# Patient Record
Sex: MALE | Race: WHITE | Employment: OTHER | RURAL
[De-identification: names, ages, dates, MRNs, and addresses within clinical notes are randomized per-mention and may not be internally consistent; named-entity substitution may affect disease eponyms.]

---

## 2017-05-31 ENCOUNTER — DOCUMENTATION ONLY (OUTPATIENT)
Dept: FAMILY MEDICINE CLINIC | Age: 67
End: 2017-05-31

## 2017-05-31 ENCOUNTER — OFFICE VISIT (OUTPATIENT)
Dept: FAMILY MEDICINE CLINIC | Age: 67
End: 2017-05-31

## 2017-05-31 VITALS
BODY MASS INDEX: 26.36 KG/M2 | OXYGEN SATURATION: 96 % | HEIGHT: 69 IN | WEIGHT: 178 LBS | TEMPERATURE: 96.8 F | SYSTOLIC BLOOD PRESSURE: 158 MMHG | RESPIRATION RATE: 18 BRPM | DIASTOLIC BLOOD PRESSURE: 93 MMHG | HEART RATE: 65 BPM

## 2017-05-31 DIAGNOSIS — B18.2 HEP C W/O COMA, CHRONIC (HCC): ICD-10-CM

## 2017-05-31 DIAGNOSIS — G47.00 INSOMNIA, UNSPECIFIED TYPE: ICD-10-CM

## 2017-05-31 DIAGNOSIS — R01.1 MURMUR, HEART: ICD-10-CM

## 2017-05-31 DIAGNOSIS — N40.1 BENIGN NON-NODULAR PROSTATIC HYPERPLASIA WITH LOWER URINARY TRACT SYMPTOMS: ICD-10-CM

## 2017-05-31 DIAGNOSIS — Z78.9 CURRENT NON-SMOKER BUT PAST SMOKING HISTORY UNKNOWN: ICD-10-CM

## 2017-05-31 DIAGNOSIS — Z00.00 EXAMINATION, MEDICAL, GENERAL: Primary | ICD-10-CM

## 2017-05-31 DIAGNOSIS — L57.0 AK (ACTINIC KERATOSIS): ICD-10-CM

## 2017-05-31 DIAGNOSIS — I10 ESSENTIAL HYPERTENSION: ICD-10-CM

## 2017-05-31 DIAGNOSIS — D23.4 BENIGN NEOPLASM OF SKIN OF NECK: ICD-10-CM

## 2017-05-31 PROBLEM — K75.9 HEPATITIS: Status: ACTIVE | Noted: 2017-05-31

## 2017-05-31 RX ORDER — CHLORTHALIDONE 25 MG/1
TABLET ORAL DAILY
COMMUNITY
End: 2017-05-31 | Stop reason: SDUPTHER

## 2017-05-31 RX ORDER — CARVEDILOL 12.5 MG/1
12.5 TABLET ORAL 2 TIMES DAILY WITH MEALS
Qty: 180 TAB | Refills: 1 | Status: SHIPPED | OUTPATIENT
Start: 2017-05-31 | End: 2017-11-27 | Stop reason: SDUPTHER

## 2017-05-31 RX ORDER — ZOLPIDEM TARTRATE 10 MG/1
10 TABLET ORAL
Qty: 20 TAB | Refills: 0 | Status: SHIPPED | OUTPATIENT
Start: 2017-05-31 | End: 2017-08-14 | Stop reason: SDUPTHER

## 2017-05-31 RX ORDER — CARVEDILOL 12.5 MG/1
TABLET ORAL 2 TIMES DAILY WITH MEALS
COMMUNITY
End: 2017-05-31 | Stop reason: SDUPTHER

## 2017-05-31 RX ORDER — ZOLPIDEM TARTRATE 10 MG/1
TABLET ORAL
COMMUNITY
End: 2017-05-31 | Stop reason: SDUPTHER

## 2017-05-31 RX ORDER — AMLODIPINE BESYLATE AND BENAZEPRIL HYDROCHLORIDE 10; 40 MG/1; MG/1
1 CAPSULE ORAL DAILY
Qty: 90 CAP | Refills: 1 | Status: SHIPPED | OUTPATIENT
Start: 2017-05-31 | End: 2017-11-27 | Stop reason: SDUPTHER

## 2017-05-31 RX ORDER — TERAZOSIN 1 MG/1
1 CAPSULE ORAL
Qty: 30 CAP | Refills: 1 | Status: SHIPPED | OUTPATIENT
Start: 2017-05-31 | End: 2017-06-28 | Stop reason: SDUPTHER

## 2017-05-31 RX ORDER — CHLORTHALIDONE 25 MG/1
TABLET ORAL
Qty: 90 TAB | Refills: 1 | Status: SHIPPED | OUTPATIENT
Start: 2017-05-31 | End: 2018-01-03 | Stop reason: SDUPTHER

## 2017-05-31 RX ORDER — AMLODIPINE BESYLATE AND BENAZEPRIL HYDROCHLORIDE 10; 40 MG/1; MG/1
1 CAPSULE ORAL DAILY
COMMUNITY
End: 2017-05-31 | Stop reason: SDUPTHER

## 2017-05-31 NOTE — MR AVS SNAPSHOT
Visit Information Date & Time Provider Department Dept. Phone Encounter #  
 5/31/2017  8:00 AM Kay Schmitz MD [address] [place] [address] [phone] [id number] Follow-up Instructions Return in about 1 month (around 6/30/2017) for follow up and lesion removal. Upcoming Health Maintenance Date Due Hepatitis C Screening 1950 DTaP/Tdap/Td series (1 - Tdap) 12/24/1973 FOBT Q 1 YEAR AGE 50-75 12/24/2002 ZOSTER VACCINE AGE 60> 12/24/2012 INFLUENZA AGE 9 TO ADULT 8/1/2017 Allergies as of 5/31/2017  Review Complete On: 5/31/2017 By: Kay Schmitz MD  
  
 Severity Noted Reaction Type Reactions Pcn [Penicillins]  05/31/2017    Rash Current Immunizations  Never Reviewed No immunizations on file. Not reviewed this visit You Were Diagnosed With   
  
 Codes Comments Examination, medical, general    -  Primary ICD-10-CM: Z00.00 ICD-9-CM: V70.9 Essential hypertension     ICD-10-CM: I10 
ICD-9-CM: 401.9 Hep C w/o coma, chronic (HCC)     ICD-10-CM: B18.2 ICD-9-CM: 070.54 Murmur, heart     ICD-10-CM: R01.1 ICD-9-CM: 785.2 Insomnia, unspecified type     ICD-10-CM: G47.00 ICD-9-CM: 780.52 Benign neoplasm of skin of neck     ICD-10-CM: D23.4 ICD-9-CM: 216.4 Benign non-nodular prostatic hyperplasia with lower urinary tract symptoms     ICD-10-CM: N40.1 ICD-9-CM: 600.91 AK (actinic keratosis)     ICD-10-CM: L57.0 ICD-9-CM: 702.0 Current non-smoker but past smoking history unknown     ICD-10-CM: Z78.9 ICD-9-CM: V49.89 Vitals BP Pulse Temp Resp Height(growth percentile) Weight(growth percentile) (!) 158/93 (BP 1 Location: Right arm, BP Patient Position: Sitting) 65 96.8 °F (36 °C) (Temporal) 18 5' 9\" (1.753 m) 178 lb (80.7 kg) SpO2 BMI Smoking Status 96% 26.29 kg/m2 Former Smoker BMI and BSA Data  Body Mass Index Body Surface Area  
 26.29 kg/m 2 1.98 m 2  
  
 Preferred Pharmacy Pharmacy Name Phone Vishal Champion, [facility] [phone] Your Updated Medication List  
  
   
This list is accurate as of: 5/31/17  9:00 AM.  Always use your most recent med list. amLODIPine-benazepril 10-40 mg per capsule Commonly known as:  Nathaliaa Wills Take 1 Cap by mouth daily. carvedilol 12.5 mg tablet Commonly known as:  Cleda Pamela Take 1 Tab by mouth two (2) times daily (with meals). chlorthalidone 25 mg tablet Commonly known as:  Moses Inches Patients takes 1/2 tab q day  
  
 terazosin 1 mg capsule Commonly known as:  HYTRIN Take 1 Cap by mouth nightly. zolpidem 10 mg tablet Commonly known as:  AMBIEN Take 1 Tab by mouth nightly as needed for Sleep. Max Daily Amount: 10 mg.  
  
  
  
  
Prescriptions Printed Refills  
 zolpidem (AMBIEN) 10 mg tablet 0 Sig: Take 1 Tab by mouth nightly as needed for Sleep. Max Daily Amount: 10 mg.  
 Class: Print Route: Oral  
 terazosin (HYTRIN) 1 mg capsule 1 Sig: Take 1 Cap by mouth nightly. Class: Print Route: Oral  
  
Prescriptions Sent to Pharmacy Refills  
 chlorthalidone (HYGROTEN) 25 mg tablet 1 Sig: Patients takes 1/2 tab q day Class: Normal  
 Pharmacy: [address], [address] Ph #: [phone]  
 carvedilol (COREG) 12.5 mg tablet 1 Sig: Take 1 Tab by mouth two (2) times daily (with meals). Class: Normal  
 Pharmacy: [address], [address] Ph #: [phone] Route: Oral  
 amLODIPine-benazepril (LOTREL) 10-40 mg per capsule 1 Sig: Take 1 Cap by mouth daily. Class: Normal  
 Pharmacy: [address], [address] Ph #: [phone] Route: Oral  
  
We Performed the Following CBC WITH AUTOMATED DIFF [67737 CPT(R)] CHG US ABDOMINAL AORTA REAL TIME SCREEN STUDY AAA [53014 CPT(R)] HCV RNA JESSICA QUALITATIVE [00560 CPT(R)] LIPID PANEL [31566 CPT(R)] METABOLIC PANEL, COMPREHENSIVE [79813 CPT(R)] AL COLLECTION VENOUS BLOOD,VENIPUNCTURE [id number] CPT(R)] AL HANDLG&/OR CONVEY OF SPEC FOR TR OFFICE TO LAB [42951 CPT(R)] PROSTATE SPECIFIC AG (PSA) [id number] CPT(R)] TSH 3RD GENERATION [68816 CPT(R)] URINALYSIS W/ RFLX MICROSCOPIC [11942 CPT(R)] Follow-up Instructions Return in about 1 month (around 6/30/2017) for follow up and lesion removal.  
  
To-Do List   
 06/05/2017 ECHO:  2D ECHO COMPLETE ADULT (TTE) W OR WO CONTR   
  
 06/07/2017 Imaging:  US ABD LTD Patient Instructions Continue current medications Take carvadalol twice daily as directed Add Hytrin one at bedtime Work on diet and exercise Lab work today 
 
ultrasounds Keep planned follow up visit Introducing [facility]! Romie Nolen introduces YourNextLeap patient portal. Now you can access parts of your medical record, email your doctor's office, and request medication refills online. 1. In your internet browser, go to [email or web address] ([email or web address])/The Health [email or web address] 2. Click on the First Time User? Click Here link in the Sign In box. You will see the New Member Sign Up page. 3. Enter your YourNextLeap Access Code exactly as it appears below. You will not need to use this code after youve completed the sign-up process. If you do not sign up before the expiration date, you must request a new code. · YourNextLeap Access Code: [email or web address] Expires: 8/29/2017  8:38 AM 
 
4. Enter the last four digits of your Social Security Number (xxxx) and Date of Birth (mm/dd/yyyy) as indicated and click Submit. You will be taken to the next sign-up page. 5. Create a YourNextLeap ID. This will be your YourNextLeap login ID and cannot be changed, so think of one that is secure and easy to remember. 6. Create a Lytro password. You can change your password at any time. 7. Enter your Password Reset Question and Answer. This can be used at a later time if you forget your password. 8. Enter your e-mail address. You will receive e-mail notification when new information is available in [address] 9. Click Sign Up. You can now view and download portions of your medical record. 10. Click the Download Summary menu link to download a portable copy of your medical information. If you have questions, please visit the Frequently Asked Questions section of the Lytro website. Remember, Lytro is NOT to be used for urgent needs. For medical emergencies, dial 911. Now available from your iPhone and Android! Please provide this summary of care documentation to your next provider. If you have any questions after today's visit, please call [phone].

## 2017-05-31 NOTE — PATIENT INSTRUCTIONS
Continue current medications  Take carvadalol twice daily as directed  Add Hytrin one at bedtime    Work on diet and exercise    Lab work today    ultrasounds    Keep planned follow up visit

## 2017-05-31 NOTE — PROGRESS NOTES
Patient has been scheduled for CPT #95002 US retroperitoneum no auth needed,scheduled for CPT 04333 US ABD ltd no auth needed and patient also scheduled for CPT# 22381 2D Echo complete Auth# [id number] valid 05/31/2017-6/29/2017

## 2017-05-31 NOTE — PROGRESS NOTES
Lola Lacey is a 61 y.o. male who presents to the office today with the following:  Chief Complaint   Patient presents with   [facility].     brought recent records from [employer]       Allergies   Allergen Reactions    Pcn [Penicillins] Rash       Current Outpatient Prescriptions   Medication Sig    chlorthalidone (HYGROTEN) 25 mg tablet Patients takes 1/2 tab q day    carvedilol (COREG) 12.5 mg tablet Take 1 Tab by mouth two (2) times daily (with meals).  amLODIPine-benazepril (LOTREL) 10-40 mg per capsule Take 1 Cap by mouth daily.  zolpidem (AMBIEN) 10 mg tablet Take 1 Tab by mouth nightly as needed for Sleep. Max Daily Amount: 10 mg.  terazosin (HYTRIN) 1 mg capsule Take 1 Cap by mouth nightly. No current facility-administered medications for this visit. Past Medical History:   Diagnosis Date    Hepatitis     type C    Hypertension        No past surgical history on file. History   Smoking Status    Former Smoker    Quit date: 1/1/1986   Smokeless Tobacco    Not on file       Family History   Problem Relation Age of Onset    Hypertension Mother     Cancer Father     Hypertension Father          History of Present Illness:  Patient here for routine annual well visit, ongoing medical follow-up and to establish my practice    Patient with a 10 year history of hypertension. He has been followed by hypertension specialist in [employer]. Last seen 2016. They have tried a variety of different medications and combinations. Currently on carvedilol Lotrel and low-dose chlorthalidone he is compliant with his medication except does forget his a.m. Patient states carvedilol frequently. Patient states they have never really been able to get his blood pressure in good control. Higher doses of Hygroton caused issues with his potassium. Basic metabolic profile normal 3978. EKG normal 2016. He has no cardiac complaints. Remote history of smoking.   Never had abdominal aortic imaging. No history of heart murmur. No history of other cardiac issues angina CAD etc.    Patient has a history of hepatitis C. Diagnosed about 10 years ago. He did undergo liver biopsy in's and some additional testing at that point. He states they did not feel further treatment was indicated then. He is aware that there are new treatments available now and would not mind being rescreened to see if he should consider additional treatment at this point. Blood work from last year reveals a minimal increase in his bilirubin but normal transaminases. Patient does take as needed Ambien. 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 Does not take a regular basis. Patient does have BPH. No personal or family history of prostate cancer. He does get up 2-3 times a night to urinate. He does have some hesitancy during the day. No incontinence. He previously was tried on Rapaflo but had some side effects from this. He has never been tried on Hytrin which may help his BPH as well as hypertension. He does drink a couple beers daily. I did suggest he cut back on that    Patient does have a couple skin lesions to temples and neck he wanted me to look at. They have come up over the last few months. Past medical history is otherwise negative patient had negative colonoscopy    2017. He was told he could go 10 years for his next colonoscopy. No family history of colon cancer    Patient had a tetanus booster within the last 10 years.   He has had the Zostavax      Review of Systems:    Review of systems negative except as noted above      Physical Exam:  Visit Vitals    BP (!) 158/93 (BP 1 Location: Right arm, BP Patient Position: Sitting)    Pulse 65    Temp 96.8 °F (36 °C) (Temporal)    Resp 18    Ht 5' 9\" (1.753 m)    Wt 178 lb (80.7 kg)    SpO2 96%    BMI 26.29 kg/m2     Vitals:    05/31/17 0800   BP: (!) 158/93   BP 1 Location: Right arm   BP Patient Position: Sitting   Pulse: 65   Resp: 18   Temp: 96.8 °F (36 °C)   TempSrc: Temporal   SpO2: 96%   Weight: 178 lb (80.7 kg)   Height: 5' 9\" (1.753 m)     Patient no acute distress vital signs repeated and as above  Head is normocephalic. Patient did have some actinic scaly patches both temples  Affect was normal  External ears normal.  Ear canals normal TMs were clear. Hearing was normal  Eyes PERRLA. EOMs full. Sclera clear. He does see his eye doctor regularly  Nose within normal limits. Nares  normal.  No significant congestion  OP mucosa normal, no obvious lesions. Pharynx normal.  No erythema or exudate. Structures midline. Juancralos Buff dental repair. He follows with his dentist regularly  Neck no nodes no masses no bruits  Right side of his neck patient had a 2 mm raised red pearly lesion consistent with early basal cell  Chest was clear no wheezes rhonchi or rales. Good air exchange  Cor regular rate and rhythm no P5-V2 1/6 systolic murmur  Abdomen no HSM no masses soft and was nontender  External rectal area was normal.  Digital exam revealed no masses. Prostate symmetrically enlarged. Soft and rubbery texture. No nodules  Extremities no edema. Nontender. Good range of motion  Gait and gross neurologic exam were normal    Assessment/Plan:  1. Examination, medical, general      2. Essential hypertension  Not optimally controlled on his current regiment. He can continue his current medications. Encouraged him to take his carvedilol twice daily as directed. Adding in low-dose Hytrin both for BPH and blood pressure control. I will see him back in 1 month to review. In the meantime I am also getting a screening abdominal aortic ultrasound. - chlorthalidone (HYGROTEN) 25 mg tablet; Patients takes 1/2 tab q day  Dispense: 90 Tab; Refill: 1  - carvedilol (COREG) 12.5 mg tablet; Take 1 Tab by mouth two (2) times daily (with meals). Dispense: 180 Tab;  Refill: 1  - amLODIPine-benazepril (LOTREL) 10-40 mg per capsule; Take 1 Cap by mouth daily. Dispense: 90 Cap; Refill: 1  - terazosin (HYTRIN) 1 mg capsule; Take 1 Cap by mouth nightly. Dispense: 30 Cap; Refill: 1  - CBC WITH AUTOMATED DIFF  - METABOLIC PANEL, COMPREHENSIVE  - URINALYSIS W/ RFLX MICROSCOPIC  - LIPID PANEL  - TSH 3RD GENERATION  - 2D ECHO COMPLETE ADULT (TTE) W OR WO CONTR; Future  - CHG US ABDOMINAL AORTA REAL TIME SCREEN STUDY AAA    3. Hep C w/o coma, chronic (HCC)    - HCV RNA JESSICA QUALITATIVE  - US ABD LTD; Future  - CT HANDLG&/OR CONVEY OF SPEC FOR TR OFFICE TO LAB  - CT COLLECTION VENOUS BLOOD,VENIPUNCTURE    4. Murmur, heart    - 2D ECHO COMPLETE ADULT (TTE) W OR WO CONTR; Future    5. Insomnia, unspecified type    - zolpidem (AMBIEN) 10 mg tablet; Take 1 Tab by mouth nightly as needed for Sleep. Max Daily Amount: 10 mg. Dispense: 20 Tab; Refill: 0    6. Benign neoplasm of skin of neck  Scheduling patient back for biopsy of the lesion of the neck and freezing of the actinic keratoses    7. Benign non-nodular prostatic hyperplasia with lower urinary tract symptoms    - PROSTATE SPECIFIC AG    8. AK (actinic keratosis)  Bringing patient back for cryotherapy    9. Current non-smoker but past smoking history unknown    - CHG US ABDOMINAL AORTA REAL TIME SCREEN STUDY AAA    Patient Instructions   Continue current medications  Take carvadalol twice daily as directed  Add Hytrin one at bedtime    Work on diet and exercise    Lab work today    ultrasounds    Keep planned follow up visit           Continue current therapy plan except for indicated above. Verbal and written instructions (see AVS) provided.  Patient expresses understanding of diagnosis and treatment plan. Follow-up Disposition:  Return in about 1 month (around 6/30/2017) for follow up and lesion removal.        Laverne Ortega.  Akila Moody MD

## 2017-06-02 LAB
ALBUMIN SERPL-MCNC: 4.8 G/DL (ref 3.6–4.8)
ALBUMIN/GLOB SERPL: 1.7 {RATIO} (ref 1.2–2.2)
ALP SERPL-CCNC: 54 IU/L (ref 39–117)
ALT SERPL-CCNC: 40 IU/L (ref 0–44)
APPEARANCE UR: CLEAR
AST SERPL-CCNC: 37 IU/L (ref 0–40)
BASOPHILS # BLD AUTO: 0 X10E3/UL (ref 0–0.2)
BASOPHILS NFR BLD AUTO: 1 %
BILIRUB SERPL-MCNC: 1.1 MG/DL (ref 0–1.2)
BILIRUB UR QL STRIP: NEGATIVE
BUN SERPL-MCNC: 14 MG/DL (ref 8–27)
BUN/CREAT SERPL: 15 (ref 10–24)
CALCIUM SERPL-MCNC: 10.3 MG/DL (ref 8.6–10.2)
CHLORIDE SERPL-SCNC: 98 MMOL/L (ref 96–106)
CHOLEST SERPL-MCNC: 159 MG/DL (ref 100–199)
CO2 SERPL-SCNC: 27 MMOL/L (ref 18–29)
COLOR UR: YELLOW
CREAT SERPL-MCNC: 0.94 MG/DL (ref 0.76–1.27)
EOSINOPHIL # BLD AUTO: 0.1 X10E3/UL (ref 0–0.4)
EOSINOPHIL NFR BLD AUTO: 3 %
ERYTHROCYTE [DISTWIDTH] IN BLOOD BY AUTOMATED COUNT: 13.7 % (ref 12.3–15.4)
GLOBULIN SER CALC-MCNC: 2.8 G/DL (ref 1.5–4.5)
GLUCOSE SERPL-MCNC: 106 MG/DL (ref 65–99)
GLUCOSE UR QL: NEGATIVE
HCT VFR BLD AUTO: 47.9 % (ref 37.5–51)
HCV RNA SERPL QL NAA+PROBE: POSITIVE
HDLC SERPL-MCNC: 56 MG/DL
HGB BLD-MCNC: 16.1 G/DL (ref 12.6–17.7)
HGB UR QL STRIP: NEGATIVE
IMM GRANULOCYTES # BLD: 0 X10E3/UL (ref 0–0.1)
IMM GRANULOCYTES NFR BLD: 0 %
INTERPRETATION, 910389: NORMAL
KETONES UR QL STRIP: NEGATIVE
LDLC SERPL CALC-MCNC: 84 MG/DL (ref 0–99)
LEUKOCYTE ESTERASE UR QL STRIP: NEGATIVE
LYMPHOCYTES # BLD AUTO: 1.5 X10E3/UL (ref 0.7–3.1)
LYMPHOCYTES NFR BLD AUTO: 36 %
MCH RBC QN AUTO: 29.7 PG (ref 26.6–33)
MCHC RBC AUTO-ENTMCNC: 33.6 G/DL (ref 31.5–35.7)
MCV RBC AUTO: 88 FL (ref 79–97)
MICRO URNS: NORMAL
MONOCYTES # BLD AUTO: 0.5 X10E3/UL (ref 0.1–0.9)
MONOCYTES NFR BLD AUTO: 13 %
NEUTROPHILS # BLD AUTO: 1.9 X10E3/UL (ref 1.4–7)
NEUTROPHILS NFR BLD AUTO: 47 %
NITRITE UR QL STRIP: NEGATIVE
PH UR STRIP: 6 [PH] (ref 5–7.5)
PLATELET # BLD AUTO: 187 X10E3/UL (ref 150–379)
POTASSIUM SERPL-SCNC: 4.5 MMOL/L (ref 3.5–5.2)
PROT SERPL-MCNC: 7.6 G/DL (ref 6–8.5)
PROT UR QL STRIP: NEGATIVE
PSA SERPL-MCNC: 2.4 NG/ML (ref 0–4)
RBC # BLD AUTO: 5.43 X10E6/UL (ref 4.14–5.8)
SODIUM SERPL-SCNC: 140 MMOL/L (ref 134–144)
SP GR UR: 1.02 (ref 1–1.03)
TRIGL SERPL-MCNC: 94 MG/DL (ref 0–149)
TSH SERPL DL<=0.005 MIU/L-ACNC: 1.37 UIU/ML (ref 0.45–4.5)
UROBILINOGEN UR STRIP-MCNC: 1 MG/DL (ref 0.2–1)
VLDLC SERPL CALC-MCNC: 19 MG/DL (ref 5–40)
WBC # BLD AUTO: 4 X10E3/UL (ref 3.4–10.8)

## 2017-06-02 NOTE — PROGRESS NOTES
Labs reviewed and discussed with patient   CBC normal  Chemistry panel including LFTs normal  PSA normal  Hepatitis C RNA was positive indicating ongoing chronic hepatitis C infection. I did suggest follow-up with hepatologist to discuss new treatment options as he has not been evaluated by them in close to 10 years  Patient is aware but wants to wait until he gets Medicare insurance later on this year  He will let me know when he is ready to schedule the appointment    Lipid panel was excellent at 159 with an LDL of 84. He does not have CAD. Previous cardiologist did not recommend statins.   Given his chronic hepatitis C and excellent lipid profile, I am recommending continuing with his diet and exercise program  Patient has follow-up later this summer

## 2017-06-08 PROBLEM — K80.20 ASYMPTOMATIC GALLSTONES: Status: ACTIVE | Noted: 2017-06-08

## 2017-06-08 PROBLEM — Q61.02 MULTIPLE RENAL CYSTS: Status: ACTIVE | Noted: 2017-06-08

## 2017-06-28 ENCOUNTER — DOCUMENTATION ONLY (OUTPATIENT)
Dept: FAMILY MEDICINE CLINIC | Age: 67
End: 2017-06-28

## 2017-06-28 ENCOUNTER — OFFICE VISIT (OUTPATIENT)
Dept: FAMILY MEDICINE CLINIC | Age: 67
End: 2017-06-28

## 2017-06-28 VITALS
DIASTOLIC BLOOD PRESSURE: 81 MMHG | HEART RATE: 60 BPM | SYSTOLIC BLOOD PRESSURE: 133 MMHG | RESPIRATION RATE: 16 BRPM | OXYGEN SATURATION: 98 % | BODY MASS INDEX: 26.43 KG/M2 | WEIGHT: 179 LBS | TEMPERATURE: 97.1 F

## 2017-06-28 DIAGNOSIS — L57.0 AK (ACTINIC KERATOSIS): ICD-10-CM

## 2017-06-28 DIAGNOSIS — N28.1 RENAL CYST: ICD-10-CM

## 2017-06-28 DIAGNOSIS — K80.20 CALCULUS OF GALLBLADDER WITHOUT CHOLECYSTITIS WITHOUT OBSTRUCTION: ICD-10-CM

## 2017-06-28 DIAGNOSIS — B18.2 HEP C W/O COMA, CHRONIC (HCC): ICD-10-CM

## 2017-06-28 DIAGNOSIS — K21.9 GASTROESOPHAGEAL REFLUX DISEASE WITHOUT ESOPHAGITIS: ICD-10-CM

## 2017-06-28 DIAGNOSIS — I51.7: ICD-10-CM

## 2017-06-28 DIAGNOSIS — N40.1 BENIGN NON-NODULAR PROSTATIC HYPERPLASIA WITH LOWER URINARY TRACT SYMPTOMS: ICD-10-CM

## 2017-06-28 DIAGNOSIS — I10 ESSENTIAL HYPERTENSION: Primary | ICD-10-CM

## 2017-06-28 DIAGNOSIS — D23.4 BENIGN NEOPLASM OF SKIN OF NECK: ICD-10-CM

## 2017-06-28 RX ORDER — ASPIRIN 81 MG/1
81 TABLET ORAL DAILY
COMMUNITY
Start: 2017-06-28

## 2017-06-28 RX ORDER — OMEPRAZOLE 10 MG/1
10 CAPSULE, DELAYED RELEASE ORAL DAILY
COMMUNITY
End: 2019-02-22 | Stop reason: SDUPTHER

## 2017-06-28 RX ORDER — TERAZOSIN 1 MG/1
1 CAPSULE ORAL
Qty: 90 CAP | Refills: 1 | Status: SHIPPED | OUTPATIENT
Start: 2017-06-28 | End: 2017-08-14 | Stop reason: DRUGHIGH

## 2017-06-28 NOTE — PROGRESS NOTES
Above discussed with patient. At the moment he wishes to hold on scheduling cardiology or Holter monitor until he gets Shady Sauer.

## 2017-06-28 NOTE — PROGRESS NOTES
Spoke with Dr Niki Li and they do not participate with the patients insurance we do here but it is not accepted thru the [facility] where Dr Colleen Kingsley bills thru so we will need to set him up with [facility] and it will need to be an appointment with the cardiologist first and then placement of holter

## 2017-06-28 NOTE — PROGRESS NOTES
Chief Complaint   Patient presents with    Follow-up     lesion (neck/face) removal     Visit Vitals    /81 (BP 1 Location: Right arm, BP Patient Position: Sitting)    Pulse 60    Temp 97.1 °F (36.2 °C) (Oral)    Resp 16    Wt 179 lb (81.2 kg)    SpO2 98%    BMI 26.43 kg/m2     Jordan Donaldson LPN

## 2017-06-28 NOTE — PATIENT INSTRUCTIONS
Continue current medications  Start baby aspirin daily    Work on diet and exercise    Holter moniter    Keep planned follow up visit     Keep a arrea clean and dry   sutuer removal 1 week

## 2017-06-28 NOTE — MR AVS SNAPSHOT
Visit Information Date & Time Provider Department Dept. Phone Encounter #  
 6/28/2017  8:00 AM Pau Clayton MD [address] [place] [address] Avenue [phone] [id number] Follow-up Instructions Return in about 1 week (around 7/5/2017) for suture removal. Upcoming Health Maintenance Date Due Pneumococcal 19-64 Medium Risk (1 of 1 - PPSV23) 12/24/1971 INFLUENZA AGE 9 TO ADULT 8/1/2017 DTaP/Tdap/Td series (2 - Td) 5/31/2027 COLONOSCOPY 5/31/2027 Allergies as of 6/28/2017  Review Complete On: 6/28/2017 By: Pau Clayton MD  
  
 Severity Noted Reaction Type Reactions Pcn [Penicillins]  05/31/2017    Rash Current Immunizations  Never Reviewed No immunizations on file. Not reviewed this visit You Were Diagnosed With   
  
 Codes Comments Essential hypertension    -  Primary ICD-10-CM: I10 
ICD-9-CM: 401.9 Hep C w/o coma, chronic (HCC)     ICD-10-CM: B18.2 ICD-9-CM: 070.54 Benign neoplasm of skin of neck     ICD-10-CM: D23.4 ICD-9-CM: 216.4 AK (actinic keratosis)     ICD-10-CM: L57.0 ICD-9-CM: 702.0 Calculus of gallbladder without cholecystitis without obstruction     ICD-10-CM: K80.20 ICD-9-CM: 574.20 Renal cyst     ICD-10-CM: N28.1 ICD-9-CM: 753.10 Benign non-nodular prostatic hyperplasia with lower urinary tract symptoms     ICD-10-CM: N40.1 ICD-9-CM: 600.91   
 TAMERA (left atrial hypertrophy)     ICD-10-CM: I51.7 ICD-9-CM: 429.3 Gastroesophageal reflux disease without esophagitis     ICD-10-CM: K21.9 ICD-9-CM: 530.81 Vitals BP Pulse Temp Resp Weight(growth percentile) SpO2  
 133/81 (BP 1 Location: Right arm, BP Patient Position: Sitting) 60 97.1 °F (36.2 °C) (Oral) 16 179 lb (81.2 kg) 98% BMI Smoking Status 26.43 kg/m2 Former Smoker BMI and BSA Data Body Mass Index Body Surface Area  
 26.43 kg/m 2 1.99 m 2 [phone] Your Updated Medication List  
  
   
This list is accurate as of: 6/28/17  8:46 AM.  Always use your most recent med list. amLODIPine-benazepril 10-40 mg per capsule Commonly known as:  Lilyan Gather Take 1 Cap by mouth daily. aspirin delayed-release 81 mg tablet Take 1 Tab by mouth daily. carvedilol 12.5 mg tablet Commonly known as:  Esaw Poppy Take 1 Tab by mouth two (2) times daily (with meals). chlorthalidone 25 mg tablet Commonly known as:  Elizabeth Reus Patients takes 1/2 tab q day PriLOSEC 10 mg capsule Generic drug:  omeprazole Take 10 mg by mouth daily. terazosin 1 mg capsule Commonly known as:  HYTRIN Take 1 Cap by mouth nightly. zolpidem 10 mg tablet Commonly known as:  AMBIEN Take 1 Tab by mouth nightly as needed for Sleep. Max Daily Amount: 10 mg.  
  
  
  
  
Prescriptions Sent to Pharmacy Refills  
 terazosin (HYTRIN) 1 mg capsule 1 Sig: Take 1 Cap by mouth nightly. Class: Normal  
 Pharmacy: [address], [address] [place] [address] #: [phone] Route: Oral  
  
We Performed the Following DESTRUC BENIGN LESION, UP TO 14 LESIONS [68688 CPT(R)] Follow-up Instructions Return in about 1 week (around 7/5/2017) for suture removal.  
  
  
Patient Instructions Continue current medications Start baby aspirin daily Work on diet and exercise Holter moniter Keep planned follow up visit Keep a arrea clean and dry  
sutuer removal 1 week Introducing [facility]! Aranza Wilson introduces WeShow patient portal. Now you can access parts of your medical record, email your doctor's office, and request medication refills online. 1. In your internet browser, go to [email or web address] 2. Click on the First Time User? Click Here link in the Sign In box.  You will see the New Member Sign Up page. 3. Enter your Resale Therapy Access Code exactly as it appears below. You will not need to use this code after youve completed the sign-up process. If you do not sign up before the expiration date, you must request a new code. · Resale Therapy Access Code: [email or web address] Expires: 8/29/2017  8:38 AM 
 
4. Enter the last four digits of your Social Security Number (xxxx) and Date of Birth (mm/dd/yyyy) as indicated and click Submit. You will be taken to the next sign-up page. 5. Create a Resale Therapy ID. This will be your Resale Therapy login ID and cannot be changed, so think of one that is secure and easy to remember. 6. Create a Resale Therapy password. You can change your password at any time. 7. Enter your Password Reset Question and Answer. This can be used at a later time if you forget your password. 8. Enter your e-mail address. You will receive e-mail notification when new information is available in [address] 9. Click Sign Up. You can now view and download portions of your medical record. 10. Click the Download Summary menu link to download a portable copy of your medical information. If you have questions, please visit the Frequently Asked Questions section of the Resale Therapy website. Remember, Resale Therapy is NOT to be used for urgent needs. For medical emergencies, dial 911. Now available from your iPhone and Android! Please provide this summary of care documentation to your next provider. Your primary care clinician is listed as Jillian Nicolas. If you have any questions after today's visit, please call [phone].

## 2017-06-28 NOTE — PROGRESS NOTES
Lola Lacey is a 61 y.o. male who presents to the office today with the following:  Chief Complaint   Patient presents with    Follow-up     lesion (neck/face) removal       Allergies   Allergen Reactions    Pcn [Penicillins] Rash       Current Outpatient Prescriptions   Medication Sig    omeprazole (PRILOSEC) 10 mg capsule Take 10 mg by mouth daily.  aspirin delayed-release 81 mg tablet Take 1 Tab by mouth daily.  terazosin (HYTRIN) 1 mg capsule Take 1 Cap by mouth nightly.  chlorthalidone (HYGROTEN) 25 mg tablet Patients takes 1/2 tab q day    carvedilol (COREG) 12.5 mg tablet Take 1 Tab by mouth two (2) times daily (with meals).  amLODIPine-benazepril (LOTREL) 10-40 mg per capsule Take 1 Cap by mouth daily.  zolpidem (AMBIEN) 10 mg tablet Take 1 Tab by mouth nightly as needed for Sleep. Max Daily Amount: 10 mg. No current facility-administered medications for this visit. Past Medical History:   Diagnosis Date    Asymptomatic gallstones 6/8/2017    Hepatitis     type C    Hypertension     Multiple renal cysts 6/8/2017    Multiple right renal cysts Largest 8.3 cm on ultrasound June 2017       History reviewed. No pertinent surgical history. History   Smoking Status    Former Smoker    Quit date: 1/1/1986   Smokeless Tobacco    Never Used       Family History   Problem Relation Age of Onset    Hypertension Mother     Cancer Father     Hypertension Father          History of Present Illness:  Patient here for lesion removal and medical follow-up    Patient has a history of sun exposure. He has a few scaly patches on his forehead bilaterally consistent with actinic keratoses. These were frozen with liquid nitrogen today. He has a raised lesion right side of his neck that has been there for  a while.   It looks like it could have been a small inflamed cyst but it did have a bit of pearly quality to it and I  brought him back for biopsy for definitive diagnosis      Patient with a 10 year history of hypertension. He has been followed by hypertension specialist in [place]. Last seen 2016. They have tried a variety of different medications and combinations. Continues with carvedilol Lotrel and low-dose chlorthalidone he is compliant with his medication. Previously forgetting his a.m. dose of carvedilol. He states he is more compliant since the last visit. We did add in low-dose Hytrin at the last visit. He is tolerated this well and blood pressures in good control today. Base metabolic profile was normal.  EKG 2016 was normal.  Ultrasound abdominal aorta 6/17- for aneurysm. He has no cardiac complaints. No history of cardiac disease angina etc. he is willing to start baby aspirin daily    I did note a heart murmur at the last visit. We did an echocardiogram.  This showed minimal mitral regurg. Also showed some impaired diastolic relaxation. Cardiology did note severe left atrial enlargement. I did discuss the case with cardiology who read the echo. They felt this might of been over read based on technique but did suggest getting a Holter monitor to rule out asymptomatic paroxysmal atrial fibrillation given the enlarged atrium. Patient has no complaints of palpitations. We tried to set up the Holter monitor. The local cardiology office who on the phone had agreed to do the Holter monitor without an official office visit does not take his insurance. The options and will be to send him to a different cardiologist for evaluation and Holter monitor. At the moment patient does not want to do that because of poor insurance coverage. He is aware of the potential risks. He is going to take his baby aspirin. He will consider getting the study when he gets his Medicare insurance    Patient has a history of hepatitis C. Diagnosed about 10 years ago. He did undergo liver biopsy in's and some additional testing at that point.   He states they did not feel further treatment was indicated then. He is aware that there are new treatments available now and would not mind being rescreened to see if he should consider additional treatment at this point. Blood work from June 2017 confirms ongoing hepatitis C. LFTs are normal.  I have recommended referral back to hepatology for consideration of treatment options. Patient is aware but he wishes to wait until he gets Shady Sauer. Ultrasound right upper quadrant revealed normal liver    Abdominal ultrasound also revealed gallstones. Gallbladder was not inflamed. He has no symptoms referable to his gallbladder. We will follow. He will notify me if he develops any symptoms    Patient was found to have renal cysts on the right. Largest 8.3 cm. Nonobstructing. We will follow in the future. Patient does have a history of GERD. He has had peptic ulcer disease in the past.  He has had an EGD in the past.  He continues on low-dose omeprazole. He states he gets symptoms if he tries to stop this medication. He has tried Zantac in the past but that does not control his symptoms. We did discuss risks of long-term PPI use. He wishes to continue his current regimen    Patient does take as needed Ambien. 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 Does not take a regular basis. Patient does have BPH. No personal or family history of prostate cancer. He was up 2-3 times a night to urinate. He does have some hesitancy during the day. No incontinence. He previously was tried on Rapaflo but had some side effects from this. Prostate exam 6/17 revealed an enlarged prostate without nodules. PSA was normal.  We did start patient on Hytrin both for blood pressure control and BPH. He states that is worked well. His urinary symptoms have improved       Past medical history is otherwise negative patient had negative colonoscopy    2017.   He was told he could go 10 years for his next colonoscopy. No family history of colon cancer    Patient had a tetanus booster within the last 10 years. He has had the Zostavax      Review of Systems:    Review of systems negative except as noted above      Physical Exam:  Visit Vitals    /81 (BP 1 Location: Right arm, BP Patient Position: Sitting)    Pulse 60    Temp 97.1 °F (36.2 °C) (Oral)    Resp 16    Wt 179 lb (81.2 kg)    SpO2 98%    BMI 26.43 kg/m2     Vitals:    06/28/17 0802   BP: 133/81   BP 1 Location: Right arm   BP Patient Position: Sitting   Pulse: 60   Resp: 16   Temp: 97.1 °F (36.2 °C)   TempSrc: Oral   SpO2: 98%   Weight: 179 lb (81.2 kg)     Patient no acute distress vital signs repeated and as above. Improved from previous  Head is normocephalic. Patient did have some actinic scaly patches both temples. These were frozen with liquid nitrogen  Affect was normal  External ears normal.  Eyes PERRLA. EOMs full. Sclera clear. He does see his eye doctor regularly  Neck on the right side did have a 4 mm raised slightly pearly papular lesion  Neck no nodes no masses no bruits  Chest was clear no wheezes rhonchi or rales. Good air exchange  Cor regular rate and rhythm no S3-S4 no murmur appreciated today  Abdomen no HSM no masses soft and was nontender  Extremities no edema. Nontender. Good range of motion  Gait and gross neurologic exam were normal      Office procedure  Risks and benefits of lesion excision discussed with patient and consent was signed  Area was cleaned with Betadine and sterilely draped  Anesthetized with 2% Xylocaine with epi  Lesion was excised using 4 mm punch biopsy  Closed with 4-0 Ethilon ×1 with good approximation of the wound edges  Sterile dressing was applied  Wound care was discussed with patient  Patient had no complications of the procedure and complete did not complain of any discomfort    1. Essential hypertension  Improved on current regimen.   Will continue current medications  - terazosin (HYTRIN) 1 mg capsule; Take 1 Cap by mouth nightly. Dispense: 90 Cap; Refill: 1    2. Hep C w/o coma, chronic ([address]  Recommending follow-up with hepatology. Patient wishes to postpone this until he gets Medicare insurance    3. Benign neoplasm of skin of neck  Lesion removal as noted above. Patient will be back in 1 week for suture removal and  - EXC SKIN BENIG <5MM TRUNK,ARM,LEG    4. AK (actinic keratosis)  Frozen today with liquid nitrogen  - DESTRUC BENIGN LESION, UP TO 14 LESIONS    5. Calculus of gallbladder without cholecystitis without obstruction  Asymptomatic we will follow    6. Renal cyst  We will follow in the future    7. Benign non-nodular prostatic hyperplasia with lower urinary tract symptoms  Symptoms improved on Hytrin    8. TAMERA (left atrial hypertrophy)  Holter monitor recommended. Patient placing this on hold because of insurance reasons    9. Gastroesophageal reflux disease without esophagitis  Asymptomatic on current regimen      Patient Instructions   Continue current medications  Start baby aspirin daily    Work on diet and exercise    Holter moniter    Keep planned follow up visit     Keep a arrea clean and dry   sutuer removal 1 week          Continue current therapy plan except for indicated above. Verbal and written instructions (see AVS) provided.  Patient expresses understanding of diagnosis and treatment plan. Follow-up Disposition:  Return in about 1 week (around 7/5/2017) for suture removal.        Kate Blandon.  Ayala Carney MD

## 2017-07-05 ENCOUNTER — OFFICE VISIT (OUTPATIENT)
Dept: FAMILY MEDICINE CLINIC | Age: 67
End: 2017-07-05

## 2017-07-05 VITALS
WEIGHT: 181.2 LBS | DIASTOLIC BLOOD PRESSURE: 80 MMHG | BODY MASS INDEX: 26.84 KG/M2 | RESPIRATION RATE: 16 BRPM | TEMPERATURE: 97.3 F | HEART RATE: 61 BPM | OXYGEN SATURATION: 96 % | SYSTOLIC BLOOD PRESSURE: 135 MMHG | HEIGHT: 69 IN

## 2017-07-05 DIAGNOSIS — I10 ESSENTIAL HYPERTENSION: ICD-10-CM

## 2017-07-05 DIAGNOSIS — Z48.02 VISIT FOR SUTURE REMOVAL: Primary | ICD-10-CM

## 2017-07-05 DIAGNOSIS — C44.91 BASAL CELL CARCINOMA: ICD-10-CM

## 2017-07-05 NOTE — MR AVS SNAPSHOT
Visit Information Date & Time Provider Department Dept. Phone Encounter #  
 7/5/2017  8:20 AM Kvng Allen MD [facility] [phone] [id number] Upcoming Health Maintenance Date Due Pneumococcal 19-64 Medium Risk (1 of 1 - PPSV23) 12/24/1969 INFLUENZA AGE 9 TO ADULT 8/1/2017 DTaP/Tdap/Td series (2 - Td) 5/31/2027 COLONOSCOPY 5/31/2027 Allergies as of 7/5/2017  Review Complete On: 7/5/2017 By: Kvng Allen MD  
  
 Severity Noted Reaction Type Reactions Pcn [Penicillins]  05/31/2017    Rash Current Immunizations  Never Reviewed No immunizations on file. Not reviewed this visit Vitals BP Pulse Temp Resp Height(growth percentile) Weight(growth percentile) 153/84 (BP 1 Location: Left arm, BP Patient Position: Sitting) 61 97.3 °F (36.3 °C) (Oral) 16 5' 9\" (1.753 m) 181 lb 3.2 oz (82.2 kg) SpO2 BMI Smoking Status 96% 26.76 kg/m2 Former Smoker BMI and BSA Data Body Mass Index Body Surface Area  
 26.76 kg/m 2 2 m 2 Preferred Pharmacy Pharmacy Name [address] [facility] [phone] Your Updated Medication List  
  
   
This list is accurate as of: 7/5/17  9:07 AM.  Always use your most recent med list. amLODIPine-benazepril 10-40 mg per capsule Commonly known as:  Elester Rajas Take 1 Cap by mouth daily. aspirin delayed-release 81 mg tablet Take 1 Tab by mouth daily. carvedilol 12.5 mg tablet Commonly known as:  Alvin Lacrosse Take 1 Tab by mouth two (2) times daily (with meals). chlorthalidone 25 mg tablet Commonly known as:  Ulyess Cirri Patients takes 1/2 tab q day PriLOSEC 10 mg capsule Generic drug:  omeprazole Take 10 mg by mouth daily. terazosin 1 mg capsule Commonly known as:  HYTRIN Take 1 Cap by mouth nightly. zolpidem 10 mg tablet Commonly known as:  AMBIEN Take 1 Tab by mouth nightly as needed for Sleep. Max Daily Amount: 10 mg. Introducing [facility]! [facility] introduces [employer] patient portal. Now you can access parts of your medical record, email your doctor's office, and request medication refills online. 1. In your internet browser, go to [email or web address] 2. Click on the First Time User? Click Here link in the Sign In box. You will see the New Member Sign Up page. 3. Enter your [employer] Access Code exactly as it appears below. You will not need to use this code after youve completed the sign-up process. If you do not sign up before the expiration date, you must request a new code. · [employer] Access Code: [email or web address] Expires: 8/29/2017  8:38 AM 
 
4. Enter the last four digits of your Social Security Number (xxxx) and Date of Birth (mm/dd/yyyy) as indicated and click Submit. You will be taken to the next sign-up page. 5. Create a [employer] ID. This will be your [employer] login ID and cannot be changed, so think of one that is secure and easy to remember. 6. Create a [employer] password. You can change your password at any time. 7. Enter your Password Reset Question and Answer. This can be used at a later time if you forget your password. 8. Enter your e-mail address. You will receive e-mail notification when new information is available in [address] 9. Click Sign Up. You can now view and download portions of your medical record. 10. Click the Download Summary menu link to download a portable copy of your medical information. If you have questions, please visit the Frequently Asked Questions section of the [employer] website. Remember, [employer] is NOT to be used for urgent needs. For medical emergencies, dial 911. Now available from your iPhone and Android! Please provide this summary of care documentation to your next provider. Your primary care clinician is listed as Yanni Potter. If you have any questions after today's visit, please call [phone].

## 2017-07-05 NOTE — PROGRESS NOTES
Helder Marques is a 61 y.o. male who presents to the office today with the following:  Chief Complaint   Patient presents with    Suture Removal     neck       Allergies   Allergen Reactions    Pcn [Penicillins] Rash       Current Outpatient Prescriptions   Medication Sig    omeprazole (PRILOSEC) 10 mg capsule Take 10 mg by mouth daily.  aspirin delayed-release 81 mg tablet Take 1 Tab by mouth daily.  terazosin (HYTRIN) 1 mg capsule Take 1 Cap by mouth nightly.  chlorthalidone (HYGROTEN) 25 mg tablet Patients takes 1/2 tab q day    carvedilol (COREG) 12.5 mg tablet Take 1 Tab by mouth two (2) times daily (with meals).  amLODIPine-benazepril (LOTREL) 10-40 mg per capsule Take 1 Cap by mouth daily.  zolpidem (AMBIEN) 10 mg tablet Take 1 Tab by mouth nightly as needed for Sleep. Max Daily Amount: 10 mg. No current facility-administered medications for this visit. Past Medical History:   Diagnosis Date    Asymptomatic gallstones 6/8/2017    Hepatitis     type C    Hypertension     Multiple renal cysts 6/8/2017    Multiple right renal cysts Largest 8.3 cm on ultrasound June 2017       No past surgical history on file. History   Smoking Status    Former Smoker    Quit date: 1/1/1986   Smokeless Tobacco    Never Used       Family History   Problem Relation Age of Onset    Hypertension Mother     Cancer Father     Hypertension Father          History of Present Illness:  Patient here for suture removal    I did remove a lesion from the right side of his neck last week. biopsy did come back showing basal cell carcinoma. Otologist felt it was completely excised. I felt that the lesion did extend close to the biopsy. He tolerated the procedure well. The stitch came out on its own. It is healing nicely. Patient does have some other skin lesions. Some look quite benign. There is one lesion right mid back which is possibly a basal cell.   He also has a scaly lesion on his right hand which he has been told in the past was a skin cancer but also then told it was psoriasis. It tends to come and go. In light of all the above I did suggest patient be seen by dermatology for full skin exam.  He is aware but because of insurance reason he is hesitant at this point. His blood pressure remains in good control on his current regiment with the addition of Hytrin      Review of Systems:      Review of systems negative except as noted above    Physical Exam:  Visit Vitals    /84 (BP 1 Location: Left arm, BP Patient Position: Sitting)    Pulse 61    Temp 97.3 °F (36.3 °C) (Oral)    Resp 16    Ht 5' 9\" (1.753 m)    Wt 181 lb 3.2 oz (82.2 kg)    SpO2 96%    BMI 26.76 kg/m2     Vitals:    07/05/17 0846   BP: 153/84   BP 1 Location: Left arm   BP Patient Position: Sitting   Pulse: 61   Resp: 16   Temp: 97.3 °F (36.3 °C)   TempSrc: Oral   SpO2: 96%   Weight: 181 lb 3.2 oz (82.2 kg)   Height: 5' 9\" (1.753 m)   Patient no acute distress. Vital signs as above on repeat  Right side of his neck. Excision site looked good. Healed well. No signs of residual tumor. No stitch  Right upper back patient did have a 0.5 x 1 cm slightly irregular pinkish flat lesion  Dorsum of right hand proximal to the third and fourth MCP joints patient had raised somewhat scaly patch      Assessment/Plan:  1. Visit for suture removal      2. Basal cell carcinoma  Patient is going to return in 1 month for recheck. Oftentimes the inflammation of the [place] itself will destroy any residual basal cell cells at the excision site. I would consider reexcision if there appears to be any recurrence of the lesion. I will also recheck his other skin lesions at that time. I told him if he does not see dermatology at that point I would recommend biopsy of the lesion on his back. He wants to see what with the cost of the biopsy of his neck was.   He will then weigh the cost of biopsy against the cost of seeing a dermatologist  3. Essential hypertension  Controlled on current regimen          Continue current therapy plan except for indicated above. Verbal and written instructions (see AVS) provided.  Patient expresses understanding of diagnosis and treatment plan. Follow-up Disposition:  Return in about 1 month (around 8/5/2017) for 40 min appt lesion removal.        Courtney Rice.  Keon Anna MD

## 2017-08-14 ENCOUNTER — OFFICE VISIT (OUTPATIENT)
Dept: FAMILY MEDICINE CLINIC | Age: 67
End: 2017-08-14

## 2017-08-14 VITALS
BODY MASS INDEX: 26.36 KG/M2 | OXYGEN SATURATION: 97 % | TEMPERATURE: 96.7 F | SYSTOLIC BLOOD PRESSURE: 165 MMHG | HEART RATE: 62 BPM | HEIGHT: 69 IN | DIASTOLIC BLOOD PRESSURE: 100 MMHG | RESPIRATION RATE: 18 BRPM | WEIGHT: 178 LBS

## 2017-08-14 DIAGNOSIS — C44.91 BASAL CELL CARCINOMA: ICD-10-CM

## 2017-08-14 DIAGNOSIS — L57.0 AK (ACTINIC KERATOSIS): ICD-10-CM

## 2017-08-14 DIAGNOSIS — G47.00 INSOMNIA, UNSPECIFIED TYPE: ICD-10-CM

## 2017-08-14 DIAGNOSIS — R00.2 PALPITATIONS: ICD-10-CM

## 2017-08-14 DIAGNOSIS — I10 ESSENTIAL HYPERTENSION: Primary | ICD-10-CM

## 2017-08-14 RX ORDER — ZOLPIDEM TARTRATE 10 MG/1
10 TABLET ORAL
Qty: 20 TAB | Refills: 0 | Status: SHIPPED | OUTPATIENT
Start: 2017-08-14 | End: 2017-11-20 | Stop reason: SDUPTHER

## 2017-08-14 RX ORDER — TERAZOSIN 2 MG/1
2 CAPSULE ORAL
Qty: 90 CAP | Refills: 1 | Status: SHIPPED | OUTPATIENT
Start: 2017-08-14 | End: 2018-01-03 | Stop reason: SDUPTHER

## 2017-08-14 NOTE — MR AVS SNAPSHOT
Visit Information Date & Time Provider Department Dept. Phone Encounter #  
 8/14/2017  8:00 AM Eladio Owen  [facility] [phone] [id number] Follow-up Instructions Return in about 4 months (around 12/14/2017). Your Appointments 12/27/2017  8:20 AM  
Any with Eladio Owen MD  
[facility]) Appt Note: 4 mo f/u,CP$25,8.14.2017  
 [address] [facility] [address] [phone]  
[phone]  
  
   
 [address] [address] Upcoming Health Maintenance Date Due Pneumococcal 19-64 Medium Risk (1 of 1 - PPSV23) 12/24/1969 INFLUENZA AGE 9 TO ADULT 8/1/2017 DTaP/Tdap/Td series (2 - Td) 5/31/2027 COLONOSCOPY 5/31/2027 Allergies as of 8/14/2017  Review Complete On: 8/14/2017 By: Eladio Owen MD  
  
 Severity Noted Reaction Type Reactions Pcn [Penicillins]  05/31/2017    Rash Current Immunizations  Never Reviewed No immunizations on file. Not reviewed this visit You Were Diagnosed With   
  
 Codes Comments Essential hypertension    -  Primary ICD-10-CM: I10 
ICD-9-CM: 401.9 Basal cell carcinoma     ICD-10-CM: C44.91 
ICD-9-CM: 173.91 Insomnia, unspecified type     ICD-10-CM: G47.00 ICD-9-CM: 780.52 Palpitations     ICD-10-CM: R00.2 ICD-9-CM: 785.1 Vitals BP Pulse Temp Resp Height(growth percentile) (!) 138/105 (BP 1 Location: Left arm, BP Patient Position: Sitting) 62 96.7 °F (35.9 °C) (Temporal) 18 5' 9\" (1.753 m) Weight(growth percentile) SpO2 BMI Smoking Status 178 lb (80.7 kg) 97% 26.29 kg/m2 Former Smoker BMI and BSA Data Body Mass Index Body Surface Area  
 26.29 kg/m 2 1.98 m 2 Preferred Pharmacy Pharmacy Name [address], [facility] [phone] Your Updated Medication List  
  
   
 This list is accurate as of: 8/14/17  8:39 AM.  Always use your most recent med list. amLODIPine-benazepril 10-40 mg per capsule Commonly known as:  Chelsea Rikki Take 1 Cap by mouth daily. aspirin delayed-release 81 mg tablet Take 1 Tab by mouth daily. carvedilol 12.5 mg tablet Commonly known as:  Ethan Heal Take 1 Tab by mouth two (2) times daily (with meals). chlorthalidone 25 mg tablet Commonly known as:  Dai Presto Patients takes 1/2 tab q day PriLOSEC 10 mg capsule Generic drug:  omeprazole Take 10 mg by mouth daily. terazosin 2 mg capsule Commonly known as:  HYTRIN Take 1 Cap by mouth nightly. zolpidem 10 mg tablet Commonly known as:  AMBIEN Take 1 Tab by mouth nightly as needed for Sleep. Max Daily Amount: 10 mg.  
  
  
  
  
Prescriptions Printed Refills  
 zolpidem (AMBIEN) 10 mg tablet 0 Sig: Take 1 Tab by mouth nightly as needed for Sleep. Max Daily Amount: 10 mg.  
 Class: Print Route: Oral  
  
Prescriptions Sent to Pharmacy Refills  
 terazosin (HYTRIN) 2 mg capsule 1 Sig: Take 1 Cap by mouth nightly. Class: Normal  
 Pharmacy: [address], [address] [place] [address] #: [phone] Route: Oral  
  
Follow-up Instructions Return in about 4 months (around 12/14/2017). Patient Instructions Continue current medications Increase hytrin to 2 mg at bedtime 
 
moniter BP and call if 140/90 Work on diet and exercise Recommend follow up with dermatology and cardiology Keep planned follow up visit Introducing [facility]! [employer] [facility] introduces [employer] patient portal. Now you can access parts of your medical record, email your doctor's office, and request medication refills online. 1. In your internet browser, go to [email or web address] 2. Click on the First Time User? Click Here link in the Sign In box.  You will see the New Member Sign Up page. 3. Enter your [employer] Access Code exactly as it appears below. You will not need to use this code after youve completed the sign-up process. If you do not sign up before the expiration date, you must request a new code. · [employer] Access Code: [email or web address] Expires: 8/29/2017  8:38 AM 
 
4. Enter the last four digits of your Social Security Number (xxxx) and Date of Birth (mm/dd/yyyy) as indicated and click Submit. You will be taken to the next sign-up page. 5. Create a [employer] ID. This will be your [employer] login ID and cannot be changed, so think of one that is secure and easy to remember. 6. Create a [employer] password. You can change your password at any time. 7. Enter your Password Reset Question and Answer. This can be used at a later time if you forget your password. 8. Enter your e-mail address. You will receive e-mail notification when new information is available in [address] 9. Click Sign Up. You can now view and download portions of your medical record. 10. Click the Download Summary menu link to download a portable copy of your medical information. If you have questions, please visit the Frequently Asked Questions section of the [employer] website. Remember, [employer] is NOT to be used for urgent needs. For medical emergencies, dial 911. Now available from your iPhone and Android! Please provide this summary of care documentation to your next provider. Your primary care clinician is listed as Sangeeta Schneider. If you have any questions after today's visit, please call [phone].

## 2017-08-14 NOTE — PROGRESS NOTES
Kim Collins is a 61 y.o. male who presents to the office today with the following:  Chief Complaint   Patient presents with    Follow-up     biopsy on neck mass, other lesions to freeze (?)       Allergies   Allergen Reactions    Pcn [Penicillins] Rash       Current Outpatient Prescriptions   Medication Sig    terazosin (HYTRIN) 2 mg capsule Take 1 Cap by mouth nightly.  zolpidem (AMBIEN) 10 mg tablet Take 1 Tab by mouth nightly as needed for Sleep. Max Daily Amount: 10 mg.    omeprazole (PRILOSEC) 10 mg capsule Take 10 mg by mouth daily.  aspirin delayed-release 81 mg tablet Take 1 Tab by mouth daily.  chlorthalidone (HYGROTEN) 25 mg tablet Patients takes 1/2 tab q day    carvedilol (COREG) 12.5 mg tablet Take 1 Tab by mouth two (2) times daily (with meals).  amLODIPine-benazepril (LOTREL) 10-40 mg per capsule Take 1 Cap by mouth daily. No current facility-administered medications for this visit. Past Medical History:   Diagnosis Date    Asymptomatic gallstones 6/8/2017    Hepatitis     type C    Hypertension     Multiple renal cysts 6/8/2017    Multiple right renal cysts Largest 8.3 cm on ultrasound June 2017       History reviewed. No pertinent surgical history. History   Smoking Status    Former Smoker    Quit date: 1/1/1986   Smokeless Tobacco    Never Used       Family History   Problem Relation Age of Onset    Hypertension Mother     Cancer Father     Hypertension Father          History of Present Illness:  Patient here for ongoing medical follow-up    Patient has a history of sun exposure. He has a few scaly patches on his forehead bilaterally consistent with actinic keratoses. These were frozen with liquid nitrogen previously. Improved today but still a few residual patches. These were frozen again with liquid nitrogen    We recently removed a basal cell carcinoma from the right side of the neck. Lesion did extend close to the margins of the biopsy.   Felt to be cleared by pathology. I brought him back for recheck. There seems to be no recurrence of the lesion. He did have a couple of lesions on his back he had asked me to look at. They are not typical for basal cell carcinomas but I cannot entirely rule this out. I it is previously suggested referral back to dermatology. He declined. I then suggested a punch biopsy. He declined. We discussed this again today. He tells me today that he actually saw a dermatologist several years ago for those lesions and was told at that time that he could simply watch them and follow-up if he were changing. Patient does not think they have changed in the last 2 years. That is reassuring but I still would feel better if he saw a dermatologist.  He is aware but states he will think about this when he gets Medicare insurance in December      Patient with a 10 year history of hypertension. He has been followed by hypertension specialist in [place]. Last seen 2016. They have tried a variety of different medications and combinations. Continues with carvedilol Lotrel and low-dose chlorthalidone he is compliant with his medication. We did add in low-dose Hytrin   He has tolerated this well. Blood pressure improved previously. Back up a bit today. He states he is compliant with this medication. Blood pressures are better when he checks at home but he would be willing to increase his medication. Base metabolic profile was normal.  EKG 2016 was normal.  Ultrasound abdominal aorta 6/17- for aneurysm. He denies any chest pain pressure or dyspnea. No history of cardiac disease angina etc. he is willing to start baby aspirin daily    I did note a heart murmur. We did an echocardiogram.  This showed minimal mitral regurg. Also showed some impaired diastolic relaxation. Cardiology did note severe left atrial enlargement. I did discuss the case with cardiology who read the echo.   They felt this might of been over read based on technique but did suggest getting a Holter monitor to rule out asymptomatic paroxysmal atrial fibrillation given the enlarged atrium. We tried to set up the Holter monitor. The local cardiology office who on the phone had agreed to do the Holter monitor without an official office visit does not take his insurance. The options and will be to send him to a different cardiologist for evaluation and Holter monitor. At the moment patient does not want to do that because of poor insurance coverage. He is aware of the potential risks. He is going to take his baby aspirin. He will consider getting the study when he gets his Shady Sauer. Today patient noted that since the above discussion he occasionally gets fleeting palpitation. He states this occurs after he exercises not during. No associated chest pressure pain or dizziness. In light of the above I again recommended evaluation with cardiology consideration toward stress test and Holter. He again declines at this point. He is aware of the indications. He again tells me he will think about this in December when he gets Medicare insurance    Patient has a history of hepatitis C. Diagnosed about 10 years ago. He did undergo liver biopsy in's and some additional testing at that point. He states they did not feel further treatment was indicated then. He is aware that there are new treatments available now and would not mind being rescreened to see if he should consider additional treatment at this point. Blood work from June 2017 confirms ongoing hepatitis C. LFTs are normal.  I have recommended referral back to hepatology for consideration of treatment options. Patient is aware but he wishes to wait until he gets Caruso Sauer. Ultrasound right upper quadrant revealed normal liver    Abdominal ultrasound also revealed gallstones. Gallbladder was not inflamed. He has no symptoms referable to his gallbladder. We will follow.   He will notify me if he develops any symptoms    Patient was found to have renal cysts on the right. Largest 8.3 cm. Nonobstructing. We will follow in the future. Patient does have a history of GERD. He has had peptic ulcer disease in the past.  He has had an EGD in the past.  He continues on low-dose omeprazole. He states he gets symptoms if he tries to stop this medication. He has tried Zantac in the past but that does not control his symptoms. We did discuss risks of long-term PPI use. He wishes to continue his current regimen    Patient does take as needed Ambien. 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 Does not take a regular basis. He did ask for refill today. Last prescription for 20 tablets was in May    Patient does have BPH. No personal or family history of prostate cancer. He was up 2-3 times a night to urinate. He does have some hesitancy during the day. No incontinence. He previously was tried on Rapaflo but had some side effects from this. Prostate exam 6/17 revealed an enlarged prostate without nodules. PSA was 2.4 normal.  We did start patient on Hytrin both for blood pressure control and BPH. He states that is worked well. His urinary symptoms have improved       Past medical history is otherwise negative     patient had negative colonoscopy 2017. He was told he could go 10 years for his next colonoscopy. No family history of colon cancer    Patient had a tetanus booster within the last 10 years.   He has had the Zostavax      Review of Systems:    Review of systems negative except as noted above      Physical Exam:  Visit Vitals    BP (!) 165/100    Pulse 62    Temp 96.7 °F (35.9 °C) (Temporal)    Resp 18    Ht 5' 9\" (1.753 m)    Wt 178 lb (80.7 kg)    SpO2 97%    BMI 26.29 kg/m2     Vitals:    08/14/17 0804 08/14/17 0849   BP: (!) 138/105 (!) 165/100   BP 1 Location: Left arm    BP Patient Position: Sitting    Pulse: 62    Resp: 18 Temp: 96.7 °F (35.9 °C)    TempSrc: Temporal    SpO2: 97%    Weight: 178 lb (80.7 kg)    Height: 5' 9\" (1.753 m)      Patient no acute distress vital signs repeated and as above. Head is normocephalic. Patient did have some actinic scaly patches both temples. These were frozen with liquid nitrogen  Excision site right side of neck at previous basal cell carcinoma revealed no evidence of recurrent lesion  Affect was normal  External ears normal.  Eyes PERRLA. EOMs full. Sclera clear. He does see his eye doctor regularly  Chest was clear no wheezes rhonchi or rales. Good air exchange  Cor regular rate and rhythm no S3-S4 no murmur appreciated today. No ectopy today  Extremities no edema. 1. Essential hypertension  Suboptimal control today. We will increase his Hytrin. I recommended follow-up in 1 month for recheck. He declined. He states he will check his blood pressure at home and call me if it remains elevated. He wants to wait until December for follow-up  - terazosin (HYTRIN) 2 mg capsule; Take 1 Cap by mouth nightly. Dispense: 90 Cap; Refill: 1    2. Basal cell carcinoma  No evidence of recurrence today    3. Insomnia, unspecified type    - zolpidem (AMBIEN) 10 mg tablet; Take 1 Tab by mouth nightly as needed for Sleep. Max Daily Amount: 10 mg. Dispense: 20 Tab; Refill: 0    4. Palpitations  Mild and intermittent. Still I recommend cardiology follow-up. Patient declining at present    5. AK (actinic keratosis)    - DESTRUC BENIGN LESION, UP TO 14 LESIONS    Patient Instructions   Continue current medications  Increase hytrin to 2 mg at bedtime    moniter BP and call if 140/90    Work on diet and exercise    Recommend follow up with dermatology and cardiology    Keep planned follow up visit           Continue current therapy plan except for indicated above. Verbal and written instructions (see AVS) provided.  Patient expresses understanding of diagnosis and treatment plan.     Follow-up Disposition:  Return in about 4 months (around 12/14/2017). Len Coley.  Dianne Pan MD

## 2017-08-14 NOTE — PATIENT INSTRUCTIONS
Continue current medications  Increase hytrin to 2 mg at bedtime    moniter BP and call if 140/90    Work on diet and exercise    Recommend follow up with dermatology and cardiology    Keep planned follow up visit

## 2017-11-20 DIAGNOSIS — G47.00 INSOMNIA, UNSPECIFIED TYPE: ICD-10-CM

## 2017-11-20 RX ORDER — ZOLPIDEM TARTRATE 10 MG/1
10 TABLET ORAL
Qty: 20 TAB | Refills: 0 | Status: SHIPPED | OUTPATIENT
Start: 2017-11-20 | End: 2018-01-03 | Stop reason: SDUPTHER

## 2017-11-20 NOTE — TELEPHONE ENCOUNTER
Patient requested refill Ambien  Has follow-up in December  reviewed.   Last refill for 20 tablets 8/14    Okay ×1 refill #20

## 2018-01-03 ENCOUNTER — OFFICE VISIT (OUTPATIENT)
Dept: FAMILY MEDICINE CLINIC | Age: 68
End: 2018-01-03

## 2018-01-03 VITALS
RESPIRATION RATE: 16 BRPM | BODY MASS INDEX: 26.85 KG/M2 | OXYGEN SATURATION: 98 % | WEIGHT: 181.8 LBS | HEART RATE: 70 BPM | TEMPERATURE: 97.2 F | SYSTOLIC BLOOD PRESSURE: 130 MMHG | DIASTOLIC BLOOD PRESSURE: 85 MMHG

## 2018-01-03 DIAGNOSIS — G47.00 INSOMNIA, UNSPECIFIED TYPE: ICD-10-CM

## 2018-01-03 DIAGNOSIS — Z00.00 MEDICARE WELCOME EXAM: Primary | ICD-10-CM

## 2018-01-03 DIAGNOSIS — I10 ESSENTIAL HYPERTENSION: ICD-10-CM

## 2018-01-03 DIAGNOSIS — Z23 ENCOUNTER FOR IMMUNIZATION: ICD-10-CM

## 2018-01-03 DIAGNOSIS — B18.2 HEP C W/O COMA, CHRONIC (HCC): ICD-10-CM

## 2018-01-03 DIAGNOSIS — R73.09 ELEVATED GLUCOSE: ICD-10-CM

## 2018-01-03 DIAGNOSIS — E78.00 PURE HYPERCHOLESTEROLEMIA: ICD-10-CM

## 2018-01-03 DIAGNOSIS — R01.1 MURMUR, HEART: ICD-10-CM

## 2018-01-03 DIAGNOSIS — K21.9 GASTROESOPHAGEAL REFLUX DISEASE WITHOUT ESOPHAGITIS: ICD-10-CM

## 2018-01-03 RX ORDER — AMLODIPINE BESYLATE AND BENAZEPRIL HYDROCHLORIDE 10; 40 MG/1; MG/1
CAPSULE ORAL
Qty: 90 CAP | Refills: 1 | Status: SHIPPED | OUTPATIENT
Start: 2018-01-03 | End: 2018-02-25 | Stop reason: SDUPTHER

## 2018-01-03 RX ORDER — CARVEDILOL 12.5 MG/1
TABLET ORAL
Qty: 180 TAB | Refills: 1 | Status: SHIPPED | OUTPATIENT
Start: 2018-01-03 | End: 2018-02-25 | Stop reason: SDUPTHER

## 2018-01-03 RX ORDER — ZOLPIDEM TARTRATE 10 MG/1
10 TABLET ORAL
Qty: 20 TAB | Refills: 0 | Status: SHIPPED | OUTPATIENT
Start: 2018-01-03 | End: 2018-03-05 | Stop reason: SDUPTHER

## 2018-01-03 RX ORDER — CHLORTHALIDONE 25 MG/1
TABLET ORAL
Qty: 90 TAB | Refills: 1 | Status: SHIPPED | OUTPATIENT
Start: 2018-01-03 | End: 2018-07-02 | Stop reason: SDUPTHER

## 2018-01-03 RX ORDER — TERAZOSIN 2 MG/1
2 CAPSULE ORAL
Qty: 90 CAP | Refills: 1 | Status: SHIPPED | OUTPATIENT
Start: 2018-01-03 | End: 2018-01-23 | Stop reason: SDUPTHER

## 2018-01-03 NOTE — PROGRESS NOTES
Lauro Arthur is a 74 y.o. male who presents to the office today with the following:  Chief Complaint   Patient presents with   [facility] Welcome To [employer]     ; pt needs refills       Allergies   Allergen Reactions    Pcn [Penicillins] Rash       Current Outpatient Prescriptions   Medication Sig    amLODIPine-benazepril (LOTREL) 10-40 mg per capsule TAKE 1 CAPSULE DAILY    carvedilol (COREG) 12.5 mg tablet TAKE 1 TABLET TWICE A DAY WITH MEALS    chlorthalidone (HYGROTEN) 25 mg tablet Patients takes 1/2 tab q day    terazosin (HYTRIN) 2 mg capsule Take 1 Cap by mouth nightly.  zolpidem (AMBIEN) 10 mg tablet Take 1 Tab by mouth nightly as needed for Sleep. Max Daily Amount: 10 mg.    omeprazole (PRILOSEC) 10 mg capsule Take 10 mg by mouth daily.  aspirin delayed-release 81 mg tablet Take 1 Tab by mouth daily. No current facility-administered medications for this visit. Past Medical History:   Diagnosis Date    Asymptomatic gallstones 6/8/2017    Hepatitis     type C    Hypertension     Multiple renal cysts 6/8/2017    Multiple right renal cysts Largest 8.3 cm on ultrasound June 2017       History reviewed. No pertinent surgical history. History   Smoking Status    Former Smoker    Quit date: 1/1/1986   Smokeless Tobacco    Never Used       Family History   Problem Relation Age of Onset    Hypertension Mother     Cancer Father     Hypertension Father          History of Present Illness:  Patient here for [facility] to Medicare visit and ongoing medical follow-up        Patient with a 10 year history of hypertension. He had been followed by hypertension specialist in [place]. Last seen 2016. They have tried a variety of different medications and combinations. Continues with carvedilol Lotrel and low-dose chlorthalidone he is compliant with his medication. We did add in low-dose Hytrin   He has tolerated this well.   Blood pressure have improved with this and are normal on my check today and when he checks it at home. He states he is compliant with this medication. . Most recent basic metabolic profile was normal.  EKG 2016 was normal.  Repeat EKG today shows sinus rhythm with poor R-wave progression across the anterior leads but no acute changes. Ultrasound abdominal aorta 6/17 negative for aneurysm. He denies any chest pain pressure or dyspnea. No history of cardiac disease angina etc. he is is on baby aspirin daily    I did note a heart murmur previously. We did an echocardiogram.  This showed minimal mitral regurg. Also showed some impaired diastolic relaxation. Cardiology did note severe left atrial enlargement. I did discuss the case with cardiology who read the echo. They felt this might of been over read based on technique but did suggest getting a Holter monitor to rule out asymptomatic paroxysmal atrial fibrillation given the enlarged atrium. We tried to set up the Holter monitor. The local cardiology office who on the phone had agreed to do the Holter monitor without an official office visit does not take his insurance. Previously patient noted that since the above discussion he occasionally gets fleeting palpitation. He states this occurs after he exercises not during. No associated chest pressure pain or dizziness. In light of the above I again recommended evaluation with cardiology consideration toward stress test and Holter. He previously declined but is willing to be scheduled for this now. No new cardiac complaints since the last visit    Patient has a history of hepatitis C. Diagnosed about 10 years ago. He did undergo liver biopsy in's and some additional testing at that point. He states they did not feel further treatment was indicated then. He is aware that there are new treatments available now Blood work from June 2017 confirms ongoing hepatitis C.   LFTs are normal.  I have recommended referral back to hepatology for consideration of treatment options. Patient is aware but he declines at present. Ultrasound right upper quadrant revealed normal liver. He tells me he wants to call his previous hepatologist and discuss this with them    Abdominal ultrasound also revealed gallstones. Gallbladder was not inflamed. He has no symptoms referable to his gallbladder. We will follow. He will notify me if he develops any symptoms    Patient was found to have renal cysts on the right. Largest 8.3 cm. Nonobstructing. We will follow in the future. Patient does have a history of GERD. He has had peptic ulcer disease in the past.  He has had an EGD in the past.  He continues on low-dose omeprazole. He states he gets symptoms if he tries to stop this medication. He has tried Zantac in the past but that does not control his symptoms. We did discuss risks of long-term PPI use. He wishes to continue his current regimen    Patient does take as needed Ambien. 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 Does not take a regular basis. He did ask for refill today. Last prescription for 20 tablets was in August    Patient has a history of sun exposure and actinic keratosis which I frozen in the past.  No lesions today      In 2017 I removed a basal cell carcinoma from the right side of the neck. Lesion did extend close to the margins of the biopsy. Felt to be cleared by pathology. I brought him back for recheck. There seems to be no recurrence of the lesion. He did have a couple of lesions on his back he had asked me to look at. They are not typical for basal cell carcinomas but I cannot entirely rule this out. I it is previously suggested referral back to dermatology. He declined. I then suggested a punch biopsy. He declined. Billie Hare He tells me today that he actually saw a dermatologist several years ago for those lesions and was told at that time that he could simply watch them and follow-up if he were changing.   Patient does not think they have changed in the last 2 years. That is reassuring but I still would feel better if he saw a dermatologist.  He is aware but declines at present    Patient does have BPH. No personal or family history of prostate cancer. He was up 2-3 times a night to urinate. He does have some hesitancy during the day. No incontinence. He previously was tried on Rapaflo but had some side effects from this. Prostate exam 6/17 revealed an enlarged prostate without nodules. PSA was 2.4 normal.  We did start patient on Hytrin both for blood pressure control and BPH. He states that is worked well. His urinary symptoms have improved       Past medical history is otherwise negative     patient had negative colonoscopy 2017. He was told he could go 10 years for his next colonoscopy. No family history of colon cancer    Patient had a tetanus booster within the last 10 years. He has had the Zostavax. Prevnar was given 12/17 flu shot earlier this season      Review of Systems:    Review of systems negative except as noted above      Physical Exam:  Visit Vitals    /85    Pulse 70    Temp 97.2 °F (36.2 °C) (Oral)    Resp 16    Wt 181 lb 12.8 oz (82.5 kg)    SpO2 98%    BMI 26.85 kg/m2     Vitals:    01/03/18 0900 01/03/18 1024   BP: (!) 151/91 130/85   BP 1 Location: Right arm    BP Patient Position: Sitting    Pulse: 70    Resp: 16    Temp: 97.2 °F (36.2 °C)    TempSrc: Oral    SpO2: 98%    Weight: 181 lb 12.8 oz (82.5 kg)      Patient no acute distress vital signs stable as above on repeat  Head is normocephalic  External ears normal.  Ear canals normal TMs were clear hearing was normal  Eyes PERRLA. EOMs full. Sclera clear patient does see his eye doctor regularly. We are getting those records  Nose within normal limits. Nares  normal.  No significant congestion  OP mucosa normal, no obvious lesions. Pharynx normal.  No erythema or exudate. Structures midline.   Patient follows with his dentist regularly  Neck no nodes no masses no bruits  Chest was clear no wheezes rhonchi or rales. Good air exchange  Cor regular rate and rhythm no S3-S4 no murmurs appreciated today. No ectopy  Abdomen no HSM no masses soft and was nontender  Extremities no edema. Nontender. Good range of motion  Gait and gross neurologic exam were normal  EKG showed normal sinus rhythm with poor R-wave progression anterior leads    1. Medicare welcome exam  See separate Medicare wellness note  - AMB POC EKG ROUTINE W/ 12 LEADS, INTER & REP  - SC COLLECTION VENOUS BLOOD,VENIPUNCTURE    2. Essential hypertension  Controlled on his current regimen. Patient will continue his current medication. Checking lab work. If all goes well I will see him back in 6 months  - CBC WITH AUTOMATED DIFF  - LIPID PANEL  - METABOLIC PANEL, COMPREHENSIVE  - amLODIPine-benazepril (LOTREL) 10-40 mg per capsule; TAKE 1 CAPSULE DAILY  Dispense: 90 Cap; Refill: 1  - carvedilol (COREG) 12.5 mg tablet; TAKE 1 TABLET TWICE A DAY WITH MEALS  Dispense: 180 Tab; Refill: 1  - chlorthalidone (HYGROTEN) 25 mg tablet; Patients takes 1/2 tab q day  Dispense: 90 Tab; Refill: 1  - terazosin (HYTRIN) 2 mg capsule; Take 1 Cap by mouth nightly. Dispense: 90 Cap; Refill: 1    3. Murmur, heart  Given history of heart murmur and atrial enlargement on previous echo I am referring patient to cardiology for further evaluation recommendations consideration toward stress testing and Holter  - REFERRAL TO CARDIOLOGY    4. Gastroesophageal reflux disease without esophagitis  Asymptomatic on current regiment    5. Hep C w/o coma, chronic (HCC)  With normal LFTs and normal hepatic ultrasound. Patient opts to discuss this with his previous hepatologist    6. Elevated glucose  Elevated glucose without diabetes. Checking lab work  - HEMOGLOBIN A1C WITH EAG    7. Encounter for immunization    - PNEUMOCOCCAL CONJ VACCINE 13 VALENT IM (Age 48 and over)    6.  Insomnia, unspecified type    - zolpidem (AMBIEN) 10 mg tablet; Take 1 Tab by mouth nightly as needed for Sleep. Max Daily Amount: 10 mg. Dispense: 20 Tab; Refill: 0      Patient Instructions     Schedule of Personalized Health Plan  (Provide Copy to Patient)  The best way to stay healthy is to live a healthy lifestyle. A healthy lifestyle includes regular exercise, eating a well-balanced diet, keeping a healthy weight and not smoking. Regular physical exams and screening tests are another important way to take care of yourself. Preventive exams provided by health care providers can find health problems early when treatment works best and can keep you from getting certain diseases or illnesses. Preventive services include exams, lab tests, screenings, shots, monitoring and information to help you take care of your own health. All people over 65 should have a pneumonia shot. Pneumonia shots are usually only needed once in a lifetime unless your doctor decides differently. All people over 65 should have a yearly flu shot. People over 65 are at medium to high risk for Hepatitis B. Three shots are needed for complete protection. In addition to your physical exam, some screening tests are recommended:    Bone mass measurement (dexa scan) is recommended every two years if you have certain risk factors, such as personal history of vertebral fracture or chronic steroid medication use    Diabetes Mellitus screening is recommended every year. Glaucoma is an eye disease caused by high pressure in the eye. An eye exam is recommended every year. Cardiovascular screening tests that check your cholesterol and other blood fat (lipid) levels are recommended every five years. Colorectal Cancer screening tests help to find pre-cancerous polyps (growths in the colon) so they can be removed before they turn into cancer. Tests ordered for screening depend on your personal and family history risk factors.     Screening for Prostate Cancer is recommended yearly with a digital rectal exam and/or a PSA test    Here is a list of your current Health Maintenance items with a due date:  Health Maintenance   Topic Date Due    GLAUCOMA SCREENING Q2Y  12/24/2017    Pneumococcal 65+ Low/Medium Risk (1 of 2 - PCV13) 12/24/2017    MEDICARE YEARLY EXAM  12/24/2017    DTaP/Tdap/Td series (2 - Td) 05/31/2027    COLONOSCOPY  05/31/2027    ZOSTER VACCINE AGE 60>  Completed    Influenza Age 5 to Adult  Completed     Continue current medications    Work on diet and exercise    Lab work today    [facility]  Recommend appt with liver specialist    Keep planned follow up visit       Patient Instructions     Schedule of [address]  (Provide Copy to Patient)  The best way to stay healthy is to live a healthy lifestyle. A healthy lifestyle includes regular exercise, eating a well-balanced diet, keeping a healthy weight and not smoking. Regular physical exams and screening tests are another important way to take care of yourself. Preventive exams provided by health care providers can find health problems early when treatment works best and can keep you from getting certain diseases or illnesses. Preventive services include exams, lab tests, screenings, shots, monitoring and information to help you take care of your own health. All people over 65 should have a pneumonia shot. Pneumonia shots are usually only needed once in a lifetime unless your doctor decides differently. All people over 65 should have a yearly flu shot. People over 65 are at medium to high risk for Hepatitis B. Three shots are needed for complete protection.   In addition to your physical exam, some screening tests are recommended:    Bone mass measurement (dexa scan) is recommended every two years if you have certain risk factors, such as personal history of vertebral fracture or chronic steroid medication use    Diabetes Mellitus screening is recommended every year.    Glaucoma is an eye disease caused by high pressure in the eye. An eye exam is recommended every year. Cardiovascular screening tests that check your cholesterol and other blood fat (lipid) levels are recommended every five years. Colorectal Cancer screening tests help to find pre-cancerous polyps (growths in the colon) so they can be removed before they turn into cancer. Tests ordered for screening depend on your personal and family history risk factors. Screening for Prostate Cancer is recommended yearly with a digital rectal exam and/or a PSA test    Here is a list of your current Health Maintenance items with a due date:  Health Maintenance   Topic Date Due    GLAUCOMA SCREENING Q2Y  12/24/2017    Pneumococcal 65+ Low/Medium Risk (1 of 2 - PCV13) 12/24/2017    MEDICARE YEARLY EXAM  12/24/2017    DTaP/Tdap/Td series (2 - Td) 05/31/2027    COLONOSCOPY  05/31/2027    ZOSTER VACCINE AGE 60>  Completed    Influenza Age 5 to Adult  Completed     Continue current medications    Work on diet and exercise    Lab work today    [facility]  Recommend appt with liver specialist    Keep planned follow up visit           Continue current therapy plan except for indicated above. Verbal and written instructions (see AVS) provided.  Patient expresses understanding of diagnosis and treatment plan. Follow-up Disposition:  Return in about 6 months (around 7/3/2018). Yanira Wang.  Carl Chew MD

## 2018-01-03 NOTE — PATIENT INSTRUCTIONS
Schedule of Personalized Health Plan  (Provide Copy to Patient)  The best way to stay healthy is to live a healthy lifestyle. A healthy lifestyle includes regular exercise, eating a well-balanced diet, keeping a healthy weight and not smoking. Regular physical exams and screening tests are another important way to take care of yourself. Preventive exams provided by health care providers can find health problems early when treatment works best and can keep you from getting certain diseases or illnesses. Preventive services include exams, lab tests, screenings, shots, monitoring and information to help you take care of your own health. All people over 65 should have a pneumonia shot. Pneumonia shots are usually only needed once in a lifetime unless your doctor decides differently. All people over 65 should have a yearly flu shot. People over 65 are at medium to high risk for Hepatitis B. Three shots are needed for complete protection. In addition to your physical exam, some screening tests are recommended:    Bone mass measurement (dexa scan) is recommended every two years if you have certain risk factors, such as personal history of vertebral fracture or chronic steroid medication use    Diabetes Mellitus screening is recommended every year. Glaucoma is an eye disease caused by high pressure in the eye. An eye exam is recommended every year. Cardiovascular screening tests that check your cholesterol and other blood fat (lipid) levels are recommended every five years. Colorectal Cancer screening tests help to find pre-cancerous polyps (growths in the colon) so they can be removed before they turn into cancer. Tests ordered for screening depend on your personal and family history risk factors.     Screening for Prostate Cancer is recommended yearly with a digital rectal exam and/or a PSA test    Here is a list of your current Health Maintenance items with a due date:  Health Maintenance Topic Date Due    GLAUCOMA SCREENING Q2Y  12/24/2017    Pneumococcal 65+ Low/Medium Risk (1 of 2 - PCV13) 12/24/2017    MEDICARE YEARLY EXAM  12/24/2017    DTaP/Tdap/Td series (2 - Td) 05/31/2027    COLONOSCOPY  05/31/2027    ZOSTER VACCINE AGE 60>  Completed    Influenza Age 5 to Adult  Completed     Continue current medications    Work on diet and exercise    Lab work today    [facility]  Recommend appt with liver specialist    Keep planned follow up visit

## 2018-01-03 NOTE — MR AVS SNAPSHOT
Visit Information Date & Time Provider Department Dept. Phone Encounter #  
 1/3/2018  8:40 AM Rick Nettles MD [address] [place] [address] Avenue [phone] [id number] Follow-up Instructions Return in about 6 months (around 7/3/2018). Upcoming Health Maintenance Date Due  
 GLAUCOMA SCREENING Q2Y 12/24/2017 Pneumococcal 65+ Low/Medium Risk (1 of 2 - PCV13) 12/24/2017 MEDICARE YEARLY EXAM 12/24/2017 DTaP/Tdap/Td series (2 - Td) 5/31/2027 COLONOSCOPY 5/31/2027 Allergies as of 1/3/2018  Review Complete On: 1/3/2018 By: Rick Nettles MD  
  
 Severity Noted Reaction Type Reactions Pcn [Penicillins]  05/31/2017    Rash Current Immunizations  Never Reviewed Name Date Influenza Vaccine PF 11/20/2017 12:00 AM  
 Pneumococcal Conjugate (PCV-13)  Incomplete Not reviewed this visit You Were Diagnosed With   
  
 Codes Comments Medicare welcome exam    -  Primary ICD-10-CM: Z00.00 ICD-9-CM: V70.0 Essential hypertension     ICD-10-CM: I10 
ICD-9-CM: 401.9 Murmur, heart     ICD-10-CM: R01.1 ICD-9-CM: 282. 2 Gastroesophageal reflux disease without esophagitis     ICD-10-CM: K21.9 ICD-9-CM: 530.81 Hep C w/o coma, chronic (HCC)     ICD-10-CM: B18.2 ICD-9-CM: 070.54 Elevated glucose     ICD-10-CM: R73.09 
ICD-9-CM: 790.29 Encounter for immunization     ICD-10-CM: V21 ICD-9-CM: V03.89 Insomnia, unspecified type     ICD-10-CM: G47.00 ICD-9-CM: 780.52 Vitals BP Pulse Temp Resp Weight(growth percentile) SpO2  
 (!) 151/91 (BP 1 Location: Right arm, BP Patient Position: Sitting) 70 97.2 °F (36.2 °C) (Oral) 16 181 lb 12.8 oz (82.5 kg) 98% BMI Smoking Status 26.85 kg/m2 Former Smoker BMI and BSA Data Body Mass Index Body Surface Area  
 26.85 kg/m 2 2 m 2 Preferred Pharmacy Pharmacy Name Phone  [facility] [phone] Your Updated Medication List  
  
   
This list is accurate as of: 1/3/18 10:15 AM.  Always use your most recent med list. amLODIPine-benazepril 10-40 mg per capsule Commonly known as:  LOTREL  
TAKE 1 CAPSULE DAILY  
  
 aspirin delayed-release 81 mg tablet Take 1 Tab by mouth daily. carvedilol 12.5 mg tablet Commonly known as:  COREG  
TAKE 1 TABLET TWICE A DAY WITH MEALS  
  
 chlorthalidone 25 mg tablet Commonly known as:  Maribell Star Patients takes 1/2 tab q day PriLOSEC 10 mg capsule Generic drug:  omeprazole Take 10 mg by mouth daily. terazosin 2 mg capsule Commonly known as:  HYTRIN Take 1 Cap by mouth nightly. zolpidem 10 mg tablet Commonly known as:  AMBIEN Take 1 Tab by mouth nightly as needed for Sleep. Max Daily Amount: 10 mg.  
  
  
  
  
Prescriptions Printed Refills  
 zolpidem (AMBIEN) 10 mg tablet 0 Sig: Take 1 Tab by mouth nightly as needed for Sleep. Max Daily Amount: 10 mg.  
 Class: Print Route: Oral  
  
Prescriptions Sent to Pharmacy Refills  
 amLODIPine-benazepril (LOTREL) 10-40 mg per capsule 1 Sig: TAKE 1 CAPSULE DAILY Class: Normal  
 Pharmacy: [address] [facility] [place] Ph #: [phone]  
 carvedilol (COREG) 12.5 mg tablet 1 Sig: TAKE 1 TABLET TWICE A DAY WITH MEALS Class: Normal  
 [facility] [address] [facility] Ph #: [phone]  
 chlorthalidone (HYGROTEN) 25 mg tablet 1 Sig: Patients takes 1/2 tab q day Class: Normal  
 Pharm[facility] [address] [facility] Ph #: [phone]  
 terazosin (HYTRIN) 2 mg capsule 1 Sig: Take 1 Cap by mouth nightly. Class: Normal  
 [facility] [address] [facility]: [phone] Route: Oral  
  
We Performed the Following AMB POC EKG ROUTINE W/ 12 LEADS, INTER & REP [34765 CPT(R)] CBC WITH AUTOMATED DIFF [52170 CPT(R)] HEMOGLOBIN A1C WITH EAG [01496 CPT(R)] LIPID PANEL [44061 CPT(R)] METABOLIC PANEL, COMPREHENSIVE [88219 CPT(R)] PNEUMOCOCCAL CONJ VACCINE 13 VALENT IM [id number] CPT(R)] NC COLLECTION VENOUS BLOOD,VENIPUNCTURE [id number] CPT(R)] REFERRAL TO CARDIOLOGY [AIY35 Custom] Comments:  
 eval abnormal echo sever left atrial enlargement Follow-up Instructions Return in about 6 months (around 7/3/2018). Referral Information Referral ID Referred By Referred To  
  
 [id number] Val Mi MD   
   [address] [place] [address] Way Phone: [phone] Fax: [phone] Visits Status Start Date End Date 1 New Request 1/3/18 1/3/19 If your referral has a status of pending review or denied, additional information will be sent to support the outcome of this decision. Patient Instructions Schedule of Personalized Health Plan (Provide Copy to Patient) The best way to stay healthy is to live a healthy lifestyle. A healthy lifestyle includes regular exercise, eating a well-balanced diet, keeping a healthy weight and not smoking. Regular physical exams and screening tests are another important way to take care of yourself. Preventive exams provided by health care providers can find health problems early when treatment works best and can keep you from getting certain diseases or illnesses. Preventive services include exams, lab tests, screenings, shots, monitoring and information to help you take care of your own health. All people over 65 should have a pneumonia shot. Pneumonia shots are usually only needed once in a lifetime unless your doctor decides differently. All people over 65 should have a yearly flu shot. People over 65 are at medium to high risk for Hepatitis B. Three shots are needed for complete protection. In addition to your physical exam, some screening tests are recommended: 
 
Bone mass measurement (dexa scan) is recommended every two years if you have certain risk factors, such as personal history of vertebral fracture or chronic steroid medication use Diabetes Mellitus screening is recommended every year. Glaucoma is an eye disease caused by high pressure in the eye. An eye exam is recommended every year. Cardiovascular screening tests that check your cholesterol and other blood fat (lipid) levels are recommended every five years. Colorectal Cancer screening tests help to find pre-cancerous polyps (growths in the colon) so they can be removed before they turn into cancer. Tests ordered for screening depend on your personal and family history risk factors. Screening for Prostate Cancer is recommended yearly with a digital rectal exam and/or a PSA test 
 
Here is a list of your current Health Maintenance items with a due date: 
Health Maintenance Topic Date Due  GLAUCOMA SCREENING Q2Y  12/24/2017  Pneumococcal 65+ Low/Medium Risk (1 of 2 - PCV13) 12/24/2017  MEDICARE YEARLY EXAM  12/24/2017  
 DTaP/Tdap/Td series (2 - Td) 05/31/2027  COLONOSCOPY  05/31/2027  ZOSTER VACCINE AGE 60>  Completed  Influenza Age 5 to Adult  Completed Continue current medications Work on diet and exercise Lab work today [facility] Recommend appt with liver specialist 
 
Keep planned follow up visit Introducing [facility]! Carmelita Fothergill introduces avocadostore patient portal. Now you can access parts of your medical record, email your doctor's office, and request medication refills online. 1. In your internet browser, go to [email or web address] 2. Click on the First Time User? Click Here link in the Sign In box. You will see the New Member Sign Up page. 3. Enter your avocadostore Access Code exactly as it appears below.  You will not need to use this code after youve completed the sign-up process. If you do not sign up before the expiration date, you must request a new code. · [employer]iem Access Code: [email or web address] Expires: 4/3/2018  9:47 AM 
 
4. Enter the last four digits of your Social Security Number (xxxx) and Date of Birth (mm/dd/yyyy) as indicated and click Submit. You will be taken to the next sign-up page. 5. Create a C[employer]iem ID. This will be your [employer] login ID and cannot be changed, so think of one that is secure and easy to remember. 6. Create a C[employer]iem password. You can change your password at any time. 7. Enter your Password Reset Question and Answer. This can be used at a later time if you forget your password. 8. Enter your e-mail address. You will receive e-mail notification when new information is available in [address] 9. Click Sign Up. You can now view and download portions of your medical record. 10. Click the Download Summary menu link to download a portable copy of your medical information. If you have questions, please visit the Frequently Asked Questions section of the [employer] website. Remember, [employer] is NOT to be used for urgent needs. For medical emergencies, dial 911. Now available from your iPhone and Android! Please provide this summary of care documentation to your next provider. Your primary care clinician is listed as Marilee Finn. If you have any questions after today's visit, please call [phone].

## 2018-01-03 NOTE — PROGRESS NOTES
Chief Complaint   Patient presents with   [facility] Welcome To Medicare     ; pt needs refills       Alvarez Cramer is a 74 y.o. male and presents for annual Medicare Wellness Visit. Problem List: Reviewed with patient and discussed risk factors. Patient Active Problem List   Diagnosis Code    Hepatitis K75.9    HTN (hypertension) I10    Essential hypertension I10    Hep C w/o coma, chronic (HCC) B18.2    Murmur, heart R01.1    Insomnia G47.00    Benign neoplasm of skin of neck D23.4    Benign non-nodular prostatic hyperplasia with lower urinary tract symptoms N40.1    AK (actinic keratosis) L57.0    Asymptomatic gallstones K80.20    Multiple renal cysts Q61.02    Gastroesophageal reflux disease without esophagitis K21.9    TAMERA (left atrial hypertrophy) I51.7    Calculus of gallbladder without cholecystitis without obstruction K80.20    Renal cyst N28.1    Basal cell carcinoma C44.91       Current medical providers:  Patient Care Team:  Amrit Streeter MD as PCP - General (Family Practice)    PSH: Reviewed with patient  History reviewed. No pertinent surgical history. SH: Reviewed with patient  Social History   Substance Use Topics    Smoking status: Former Smoker     Quit date: 1/1/1986    Smokeless tobacco: Never Used    Alcohol use 6.0 oz/week     10 Cans of beer per week      Comment: social       FH: Reviewed with patient  Family History   Problem Relation Age of Onset    Hypertension Mother     Cancer Father     Hypertension Father        Medications/Allergies: Reviewed with patient  Current Outpatient Prescriptions on File Prior to Visit   Medication Sig Dispense Refill    amLODIPine-benazepril (LOTREL) 10-40 mg per capsule TAKE 1 CAPSULE DAILY 90 Cap 0    carvedilol (COREG) 12.5 mg tablet TAKE 1 TABLET TWICE A DAY WITH MEALS 180 Tab 0    zolpidem (AMBIEN) 10 mg tablet Take 1 Tab by mouth nightly as needed for Sleep.  Max Daily Amount: 10 mg. 20 Tab 0    terazosin (HYTRIN) 2 mg capsule Take 1 Cap by mouth nightly. 90 Cap 1    omeprazole (PRILOSEC) 10 mg capsule Take 10 mg by mouth daily.  aspirin delayed-release 81 mg tablet Take 1 Tab by mouth daily.  chlorthalidone (HYGROTEN) 25 mg tablet Patients takes 1/2 tab q day 90 Tab 1     No current facility-administered medications on file prior to visit. Allergies   Allergen Reactions    Pcn [Penicillins] Rash       Objective:  Visit Vitals    BP (!) 151/91 (BP 1 Location: Right arm, BP Patient Position: Sitting)    Pulse 70    Temp 97.2 °F (36.2 °C) (Oral)    Resp 16    Wt 181 lb 12.8 oz (82.5 kg)    SpO2 98%    BMI 26.85 kg/m2    Body mass index is 26.85 kg/(m^2). Assessment of cognitive impairment: Alert and oriented x 3    Depression Screen:   PHQ over the last two weeks 1/3/2018   Little interest or pleasure in doing things Not at all   Feeling down, depressed or hopeless Not at all   Total Score PHQ 2 0       Fall Risk Assessment:    Fall Risk Assessment, last 12 mths 1/3/2018   Able to walk? Yes   Fall in past 12 months? No       Functional Ability:   Does the patient exhibit a steady gait? yes   How long did it take the patient to get up and walk from a sitting position? 2sec   Is the patient self reliant?  (ie can do own laundry, meals, household chores)  yes     Does the patient handle his/her own medications? yes     Does the patient handle his/her own money? yes     Is the patients home safe (ie good lighting, handrails on stairs and bath, etc.)? yes     Did you notice or did patient express any hearing difficulties? no     Did you notice or did patient express any vision difficulties?   no     Were distance and reading eye charts used? yes       Advance Care Planning:   Patient was offered the opportunity to discuss advance care planning:  yes     Does patient have an Advance Directive:  no   If no, did you provide information on Caring Connections? yes       Plan: Prevnar given today.   Labs ordered. EKG done. Requesting records from ophthalmology    No orders of the defined types were placed in this encounter. Health Maintenance   Topic Date Due    GLAUCOMA SCREENING Q2Y  12/24/2017    Pneumococcal 65+ Low/Medium Risk (1 of 2 - PCV13) 12/24/2017    MEDICARE YEARLY EXAM  12/24/2017    DTaP/Tdap/Td series (2 - Td) 05/31/2027    COLONOSCOPY  05/31/2027    ZOSTER VACCINE AGE 60>  Completed    Influenza Age 5 to Adult  Completed       *Patient verbalized understanding and agreement with the plan. A copy of the After Visit Summary with personalized health plan was given to the patient today.       Nataliia Edwards LPN

## 2018-01-04 LAB
ALBUMIN SERPL-MCNC: 4.8 G/DL (ref 3.6–4.8)
ALBUMIN/GLOB SERPL: 1.6 {RATIO} (ref 1.2–2.2)
ALP SERPL-CCNC: 65 IU/L (ref 39–117)
ALT SERPL-CCNC: 43 IU/L (ref 0–44)
AST SERPL-CCNC: 33 IU/L (ref 0–40)
BASOPHILS # BLD AUTO: 0 X10E3/UL (ref 0–0.2)
BASOPHILS NFR BLD AUTO: 0 %
BILIRUB SERPL-MCNC: 1.4 MG/DL (ref 0–1.2)
BUN SERPL-MCNC: 15 MG/DL (ref 8–27)
BUN/CREAT SERPL: 16 (ref 10–24)
CALCIUM SERPL-MCNC: 10.2 MG/DL (ref 8.6–10.2)
CHLORIDE SERPL-SCNC: 100 MMOL/L (ref 96–106)
CHOLEST SERPL-MCNC: 185 MG/DL (ref 100–199)
CO2 SERPL-SCNC: 28 MMOL/L (ref 18–29)
CREAT SERPL-MCNC: 0.95 MG/DL (ref 0.76–1.27)
EOSINOPHIL # BLD AUTO: 0.1 X10E3/UL (ref 0–0.4)
EOSINOPHIL NFR BLD AUTO: 2 %
ERYTHROCYTE [DISTWIDTH] IN BLOOD BY AUTOMATED COUNT: 13.8 % (ref 12.3–15.4)
EST. AVERAGE GLUCOSE BLD GHB EST-MCNC: 114 MG/DL
GLOBULIN SER CALC-MCNC: 3 G/DL (ref 1.5–4.5)
GLUCOSE SERPL-MCNC: 107 MG/DL (ref 65–99)
HBA1C MFR BLD: 5.6 % (ref 4.8–5.6)
HCT VFR BLD AUTO: 46.6 % (ref 37.5–51)
HDLC SERPL-MCNC: 54 MG/DL
HGB BLD-MCNC: 16.3 G/DL (ref 13–17.7)
IMM GRANULOCYTES # BLD: 0 X10E3/UL (ref 0–0.1)
IMM GRANULOCYTES NFR BLD: 0 %
INTERPRETATION, 910389: NORMAL
LDLC SERPL CALC-MCNC: 111 MG/DL (ref 0–99)
LYMPHOCYTES # BLD AUTO: 1.7 X10E3/UL (ref 0.7–3.1)
LYMPHOCYTES NFR BLD AUTO: 32 %
MCH RBC QN AUTO: 28.8 PG (ref 26.6–33)
MCHC RBC AUTO-ENTMCNC: 35 G/DL (ref 31.5–35.7)
MCV RBC AUTO: 82 FL (ref 79–97)
MONOCYTES # BLD AUTO: 0.7 X10E3/UL (ref 0.1–0.9)
MONOCYTES NFR BLD AUTO: 13 %
NEUTROPHILS # BLD AUTO: 2.7 X10E3/UL (ref 1.4–7)
NEUTROPHILS NFR BLD AUTO: 53 %
PLATELET # BLD AUTO: 170 X10E3/UL (ref 150–379)
POTASSIUM SERPL-SCNC: 4.4 MMOL/L (ref 3.5–5.2)
PROT SERPL-MCNC: 7.8 G/DL (ref 6–8.5)
RBC # BLD AUTO: 5.66 X10E6/UL (ref 4.14–5.8)
SODIUM SERPL-SCNC: 140 MMOL/L (ref 134–144)
TRIGL SERPL-MCNC: 99 MG/DL (ref 0–149)
VLDLC SERPL CALC-MCNC: 20 MG/DL (ref 5–40)
WBC # BLD AUTO: 5.2 X10E3/UL (ref 3.4–10.8)

## 2018-01-08 RX ORDER — ATORVASTATIN CALCIUM 10 MG/1
10 TABLET, FILM COATED ORAL DAILY
Qty: 30 TAB | Refills: 1 | Status: SHIPPED | OUTPATIENT
Start: 2018-01-08 | End: 2018-02-21 | Stop reason: SDUPTHER

## 2018-01-08 NOTE — PROGRESS NOTES
Lab work reviewed and discussed with patient  CBC blood chemistries A1c all normal  Lipids up a bit from previous  Given his overall 10 year cardiac risk of greater than 7.5% statin is recommended  Risks and benefits were discussed with patient and he wishes to start statin therapy  I will use Lipitor at 10 mg daily.   I will avoid higher doses if possible given his remote history of hepatitis C  Recheck lab work in 1 month  He will keep his previous planned follow-up

## 2018-01-23 ENCOUNTER — OFFICE VISIT (OUTPATIENT)
Dept: CARDIOLOGY CLINIC | Age: 68
End: 2018-01-23

## 2018-01-23 VITALS
HEART RATE: 62 BPM | SYSTOLIC BLOOD PRESSURE: 132 MMHG | RESPIRATION RATE: 18 BRPM | BODY MASS INDEX: 27.11 KG/M2 | DIASTOLIC BLOOD PRESSURE: 84 MMHG | WEIGHT: 183 LBS | HEIGHT: 69 IN | OXYGEN SATURATION: 98 %

## 2018-01-23 DIAGNOSIS — E78.5 DYSLIPIDEMIA: ICD-10-CM

## 2018-01-23 DIAGNOSIS — R00.2 PALPITATIONS: ICD-10-CM

## 2018-01-23 DIAGNOSIS — B18.2 HEP C W/O COMA, CHRONIC (HCC): ICD-10-CM

## 2018-01-23 DIAGNOSIS — I10 ESSENTIAL HYPERTENSION: Primary | ICD-10-CM

## 2018-01-23 DIAGNOSIS — I10 ESSENTIAL HYPERTENSION: ICD-10-CM

## 2018-01-23 DIAGNOSIS — R01.1 MURMUR, HEART: ICD-10-CM

## 2018-01-23 DIAGNOSIS — I34.0 MITRAL VALVE INSUFFICIENCY, UNSPECIFIED ETIOLOGY: ICD-10-CM

## 2018-01-23 DIAGNOSIS — K21.9 GASTROESOPHAGEAL REFLUX DISEASE WITHOUT ESOPHAGITIS: ICD-10-CM

## 2018-01-23 DIAGNOSIS — I51.7: ICD-10-CM

## 2018-01-23 RX ORDER — TERAZOSIN 2 MG/1
CAPSULE ORAL
Qty: 90 CAP | Refills: 1 | Status: SHIPPED | OUTPATIENT
Start: 2018-01-23 | End: 2018-07-02 | Stop reason: SDUPTHER

## 2018-01-23 NOTE — PROGRESS NOTES
Dustin England is a 74 y.o. male is here for cardiac evaluation. Hx hypertension, dyslipidemia, GERD, Hep C, cholelithiasis, renal cysts, heart murmur. Had Echo 6/16 with mild LVH, LVEF 65-70, severe LAE, mild MR. Annual wellness check earlier this month with Dr. Moraima Pierre, EKG, etc. No prior hx known CAD. No hx afib. occ palpitations. No prior/recent stress testing  The patient denies chest pain/ shortness of breath, orthopnea, PND, LE edema,  syncope, presyncope or fatigue. Patient Active Problem List    Diagnosis Date Noted    Basal cell carcinoma 07/05/2017    Gastroesophageal reflux disease without esophagitis 06/28/2017    TAMERA (left atrial hypertrophy) 06/28/2017    Calculus of gallbladder without cholecystitis without obstruction 06/28/2017    Renal cyst 06/28/2017    Asymptomatic gallstones 06/08/2017    Multiple renal cysts 06/08/2017    Hepatitis 05/31/2017    HTN (hypertension) 05/31/2017    Essential hypertension 05/31/2017    Hep C w/o coma, chronic ([address] 05/31/2017    Murmur, heart 05/31/2017    Insomnia 05/31/2017    Benign neoplasm of skin of neck 05/31/2017    Benign non-nodular prostatic hyperplasia with lower urinary tract symptoms 05/31/2017    AK (actinic keratosis) 05/31/2017      Melchor Burt MD  Past Medical History:   Diagnosis Date    Asymptomatic gallstones 6/8/2017    Hepatitis     type C    Hypertension     Multiple renal cysts 6/8/2017    Multiple right renal cysts Largest 8.3 cm on ultrasound June 2017      No past surgical history on file. Allergies   Allergen Reactions    Pcn [Penicillins] Rash      Family History   Problem Relation Age of Onset    Hypertension Mother     Cancer Father     Hypertension Father       Social History     Social History    Marital status:      Spouse name: N/A    Number of children: N/A    Years of education: N/A     Occupational History    Not on file.      Social History Main Topics    Smoking status: Former Smoker     Quit date: 1/1/1986    Smokeless tobacco: Never Used    Alcohol use 6.0 oz/week     10 Cans of beer per week      Comment: social    Drug use: No    Sexual activity: Yes     Partners: Female     Birth control/ protection: None     Other Topics Concern    Not on file     Social History Narrative      Current Outpatient Prescriptions   Medication Sig    terazosin (HYTRIN) 2 mg capsule TAKE 1 CAPSULE NIGHTLY    atorvastatin (LIPITOR) 10 mg tablet Take 1 Tab by mouth daily.  amLODIPine-benazepril (LOTREL) 10-40 mg per capsule TAKE 1 CAPSULE DAILY    carvedilol (COREG) 12.5 mg tablet TAKE 1 TABLET TWICE A DAY WITH MEALS    chlorthalidone (HYGROTEN) 25 mg tablet Patients takes 1/2 tab q day    zolpidem (AMBIEN) 10 mg tablet Take 1 Tab by mouth nightly as needed for Sleep. Max Daily Amount: 10 mg.    omeprazole (PRILOSEC) 10 mg capsule Take 10 mg by mouth daily.  aspirin delayed-release 81 mg tablet Take 1 Tab by mouth daily. No current facility-administered medications for this visit. Review of Symptoms:    CONST  No weight change. No fever, chills, sweats    ENT No visual changes, URI sx, sore throat    CV  See HPI   RESP  No cough, or sputum, wheezing. Also see HPI   GI  No abdominal pain or change in bowel habits. No heartburn or dysphagia. No melena or rectal bleeding.   No dysuria, urgency, frequency, hematuria   MSKEL  No joint pain, swelling. No muscle pain. SKIN  No rash or lesions. NEURO  No headache, syncope, or seizure. No weakness, loss of sensation, or paresthesias. PSYCH  No low mood or depression  No anxiety. HE/LYMPH  No easy bruising, abnormal bleeding, or enlarged glands. Physical ExamPhysical Exam:    There were no vitals taken for this visit.   Gen: NAD  HEENT:  PERRL, throat clear  Neck: no adenopathy, no thyromegaly, no JVD   Heart:  Regular,Nl E2S8,  II/VI systolic murmur, no gallop or rub.   Lungs: clear  Abdomen:   Soft, non-tender, bowel sounds are active.   Extremities:  No edema  Pulse: symmetric  Neuro: A&O times 3, No focal neuro deficits    Cardiographics    ECG: from 1/3/18--NSR, PRWP      Labs:   Lab Results   Component Value Date/Time    Sodium 140 01/03/2018 10:05 AM    Sodium 140 05/31/2017 08:54 AM    Potassium 4.4 01/03/2018 10:05 AM    Potassium 4.5 05/31/2017 08:54 AM    Chloride 100 01/03/2018 10:05 AM    Chloride 98 05/31/2017 08:54 AM    CO2 28 01/03/2018 10:05 AM    CO2 27 05/31/2017 08:54 AM    Glucose 107 01/03/2018 10:05 AM    Glucose 106 05/31/2017 08:54 AM    BUN 15 01/03/2018 10:05 AM    BUN 14 05/31/2017 08:54 AM    Creatinine 0.95 01/03/2018 10:05 AM    Creatinine 0.94 05/31/2017 08:54 AM    BUN/Creatinine ratio 16 01/03/2018 10:05 AM    BUN/Creatinine ratio 15 05/31/2017 08:54 AM    GFR est AA 97 01/03/2018 10:05 AM    GFR est AA 99 05/31/2017 08:54 AM    GFR est non-AA 84 01/03/2018 10:05 AM    GFR est non-AA 85 05/31/2017 08:54 AM    Calcium 10.2 01/03/2018 10:05 AM    Calcium 10.3 05/31/2017 08:54 AM    Bilirubin, total 1.4 01/03/2018 10:05 AM    Bilirubin, total 1.1 05/31/2017 08:54 AM    AST (SGOT) 33 01/03/2018 10:05 AM    AST (SGOT) 37 05/31/2017 08:54 AM    Alk. phosphatase 65 01/03/2018 10:05 AM    Alk.  phosphatase 54 05/31/2017 08:54 AM    Protein, total 7.8 01/03/2018 10:05 AM    Protein, total 7.6 05/31/2017 08:54 AM    Albumin 4.8 01/03/2018 10:05 AM    Albumin 4.8 05/31/2017 08:54 AM    A-G Ratio 1.6 01/03/2018 10:05 AM    A-G Ratio 1.7 05/31/2017 08:54 AM    ALT (SGPT) 43 01/03/2018 10:05 AM    ALT (SGPT) 40 05/31/2017 08:54 AM     No results found for: CPK, CPKX, CPX  Lab Results   Component Value Date/Time    Cholesterol, total 185 01/03/2018 10:05 AM    Cholesterol, total 159 05/31/2017 08:54 AM    HDL Cholesterol 54 01/03/2018 10:05 AM    HDL Cholesterol 56 05/31/2017 08:54 AM    LDL, calculated 111 01/03/2018 10:05 AM    LDL, calculated 84 05/31/2017 08:54 AM Triglyceride 99 01/03/2018 10:05 AM    Triglyceride 94 05/31/2017 08:54 AM     No results found for this or any previous visit. Assessment:         Patient Active Problem List    Diagnosis Date Noted    Basal cell carcinoma 07/05/2017    Gastroesophageal reflux disease without esophagitis 06/28/2017    TAMERA (left atrial hypertrophy) 06/28/2017    Calculus of gallbladder without cholecystitis without obstruction 06/28/2017    Renal cyst 06/28/2017    Asymptomatic gallstones 06/08/2017    Multiple renal cysts 06/08/2017    Hepatitis 05/31/2017    HTN (hypertension) 05/31/2017    Essential hypertension 05/31/2017    Hep C w/o coma, chronic ([address] 05/31/2017    Murmur, heart 05/31/2017    Insomnia 05/31/2017    Benign neoplasm of skin of neck 05/31/2017    Benign non-nodular prostatic hyperplasia with lower urinary tract symptoms 05/31/2017    AK (actinic keratosis) 05/31/2017      Hx hypertension, dyslipidemia, GERD, Hep C, cholelithiasis, renal cysts, heart murmur. Had Echo 6/16 with mild LVH, LVEF 65-70, severe LAE, mild MR. Annual wellness check earlier this month with Dr. Amy Vaca, EKG, etc. No prior hx known CAD. No hx afib. occ palpitations.   No prior/recent stress testing       Plan:     Holter monitor 48 hrs--r/o afib, LAE, palpitations  Stress treadmill EKG--multiple CV risks, abnormal EKG  Has mitral regurg--mild on Echo 6/16 but has LAE, ?elevated LVEDP--will need to follow  Long-standing hypertension, multiple meds, mild LVH on Echo--reasonably well controlled recently  F/u after testing to discuss  Should f/u with GI/hepatology as well (Hep C, long-standing GERD on PPI), etc    Francisco Rojas MD

## 2018-01-23 NOTE — PROGRESS NOTES
Verified patient with two patient identifiers. Medications reviewed/approved by Dr. Neto Good. Verbal from Dr. Neto Good to remove the medications that were deleted during the visit. Chief Complaint   Patient presents with    Abnormal Cardiac Test     New patient evaluation - referred by Dr. Wisam Sanon     1. Have you been to the ER, urgent care clinic since your last visit? Hospitalized since your last visit? New pt.    2. Have you seen or consulted any other health care providers outside of the [address] [place] [address] since your last visit? Include any pap smears or colon screening. New pt.

## 2018-01-23 NOTE — MR AVS SNAPSHOT
[address] [place] [address] [phone] Patient: Donny Montes MRN: [id number] :1950 Visit Information Date & Time Provider Department Dept. Phone Encounter #  
 2018  2:20 PM Arlensean Marcemarcie, 1024 [facility] [address] [place] [address] [phone] Your Appointments 2018  9:30 AM  
Any with Sheree Constantino MD  
[address] [place] [address]) Appt Note: 6 month f.up cp$0 1/3/18 hme  
 [address] [phone]  
  
   
 [address] [address] Upcoming Health Maintenance Date Due  
 GLAUCOMA SCREENING Q2Y 2017 Pneumococcal 65+ Low/Medium Risk (2 of 2 - PPSV23) 1/3/2019 MEDICARE YEARLY EXAM 2019 DTaP/Tdap/Td series (2 - Td) 2027 COLONOSCOPY 2027 Allergies as of 2018  Review Complete On: 2018 By: Elisa Garcia LPN Severity Noted Reaction Type Reactions Pcn [Penicillins]  2017    Rash Current Immunizations  Never Reviewed Name Date Influenza Vaccine PF 2017 12:00 AM  
 Pneumococcal Conjugate (PCV-13) 1/3/2018 Not reviewed this visit You Were Diagnosed With   
  
 Codes Comments Essential hypertension    -  Primary ICD-10-CM: I10 
ICD-9-CM: 401.9 TAMERA (left atrial hypertrophy)     ICD-10-CM: I51.7 ICD-9-CM: 429.3 Murmur, heart     ICD-10-CM: R01.1 ICD-9-CM: 785.2 Mitral valve insufficiency, unspecified etiology     ICD-10-CM: I34.0 ICD-9-CM: 424.0 Dyslipidemia     ICD-10-CM: E78.5 ICD-9-CM: 272.4 Hep C w/o coma, chronic (HCC)     ICD-10-CM: B18.2 ICD-9-CM: 070.54 Gastroesophageal reflux disease without esophagitis     ICD-10-CM: K21.9 ICD-9-CM: 530.81 Palpitations     ICD-10-CM: R00.2 ICD-9-CM: 785.1 Vitals BP Pulse Resp Height(growth percentile) Weight(growth percentile) SpO2  
 132/84 (BP 1 Location: Left arm, BP Patient Position: Sitting) 62 18 5' 9\" (1.753 m) 183 lb (83 kg) 98% BMI Smoking Status 27.02 kg/m2 Former Smoker Vitals History BMI and BSA Data Body Mass Index Body Surface Area  
 27.02 kg/m 2 2.01 m 2 Preferred Pharmacy Pharmacy Name Phone [facility]ney Harkins [facility] [phone] Your Updated Medication List  
  
   
This list is accurate as of: 1/23/18  3:34 PM.  Always use your most recent med list. amLODIPine-benazepril 10-40 mg per capsule Commonly known as:  LOTREL  
TAKE 1 CAPSULE DAILY  
  
 aspirin delayed-release 81 mg tablet Take 1 Tab by mouth daily. atorvastatin 10 mg tablet Commonly known as:  LIPITOR Take 1 Tab by mouth daily. carvedilol 12.5 mg tablet Commonly known as:  COREG  
TAKE 1 TABLET TWICE A DAY WITH MEALS  
  
 chlorthalidone 25 mg tablet Commonly known as:  Olene Rouge Patients takes 1/2 tab q day PriLOSEC 10 mg capsule Generic drug:  omeprazole Take 10 mg by mouth daily. terazosin 2 mg capsule Commonly known as:  HYTRIN  
TAKE 1 CAPSULE NIGHTLY  
  
 zolpidem 10 mg tablet Commonly known as:  AMBIEN Take 1 Tab by mouth nightly as needed for Sleep. Max Daily Amount: 10 mg. To-Do List   
 Around 01/23/2018 ECG:  ECG HOLTER MONITOR, UP TO 48 HRS Around 01/26/2018 ECG:  STRESS TEST CARDIAC Introducing [facility]! Cherise Meyer introduces [employer] patient portal. Now you can access parts of your medical record, email your doctor's office, and request medication refills online. 1. In your internet browser, go to [email or web address] 2. Click on the First Time User? Click Here link in the Sign In box. You will see the New Member Sign Up page. 3. Enter your [employer] Access Code exactly as it appears below. You will not need to use this code after youve completed the sign-up process. If you do not sign up before the expiration date, you must request a new code. · [employer] Access Code: [email or web address] Expires: 4/3/2018  9:47 AM 
 
4. Enter the last four digits of your Social Security Number (xxxx) and Date of Birth (mm/dd/yyyy) as indicated and click Submit. You will be taken to the next sign-up page. 5. Create a [employer] ID. This will be your [employer] login ID and cannot be changed, so think of one that is secure and easy to remember. 6. Create a [employer] password. You can change your password at any time. 7. Enter your Password Reset Question and Answer. This can be used at a later time if you forget your password. 8. Enter your e-mail address. You will receive e-mail notification when new information is available in [address] 9. Click Sign Up. You can now view and download portions of your medical record. 10. Click the Download Summary menu link to download a portable copy of your medical information. If you have questions, please visit the Frequently Asked Questions section of the [employer] website. Remember, [employer] is NOT to be used for urgent needs. For medical emergencies, dial 911. Now available from your iPhone and Android! Please provide this summary of care documentation to your next provider. Your primary care clinician is listed as Jane Bowen. If you have any questions after today's visit, please call [phone].

## 2018-01-29 ENCOUNTER — CLINICAL SUPPORT (OUTPATIENT)
Dept: CARDIOLOGY CLINIC | Age: 68
End: 2018-01-29

## 2018-01-29 DIAGNOSIS — R00.2 PALPITATIONS: ICD-10-CM

## 2018-01-29 DIAGNOSIS — I51.7: ICD-10-CM

## 2018-01-29 NOTE — PROGRESS NOTES
48 hour Holter monitor only. Verified patient with two patient identifiers. Pt verbalized understanding of its use. Ordering SEMAJ Harrell  Reason:  palpitations, left atrial enlargement, r/o afib  Start time: 9:19 am  Return date: 1/31/18        No LOS.

## 2018-01-31 ENCOUNTER — DOCUMENTATION ONLY (OUTPATIENT)
Dept: CARDIOLOGY CLINIC | Age: 68
End: 2018-01-31

## 2018-02-02 ENCOUNTER — TELEPHONE (OUTPATIENT)
Dept: CARDIOLOGY CLINIC | Age: 68
End: 2018-02-02

## 2018-02-02 NOTE — TELEPHONE ENCOUNTER
----- Message from Andrés Yin MD sent at 2/2/2018 12:48 PM EST -----  Regarding: Holter, Stress test  Advise stress test looked ok, hypertensive, but no ischemia with good exercise tolerance. Holter showed PAC's and PVC's with short runs of PAT vs paroxysmal atrial fibrillation--return to discuss. Thanks [facility]    Upcoming appt:  2/12/2018 10:20 AM Andrés Yin MD     Verified patient with two patient identifiers. Results given and questions answered. Patient verbalized understanding. Pt confirmed his upcoming appt.

## 2018-02-12 ENCOUNTER — OFFICE VISIT (OUTPATIENT)
Dept: CARDIOLOGY CLINIC | Age: 68
End: 2018-02-12

## 2018-02-12 VITALS
SYSTOLIC BLOOD PRESSURE: 142 MMHG | HEIGHT: 69 IN | HEART RATE: 54 BPM | RESPIRATION RATE: 14 BRPM | WEIGHT: 184 LBS | BODY MASS INDEX: 27.25 KG/M2 | DIASTOLIC BLOOD PRESSURE: 86 MMHG | OXYGEN SATURATION: 96 %

## 2018-02-12 DIAGNOSIS — E78.5 DYSLIPIDEMIA: ICD-10-CM

## 2018-02-12 DIAGNOSIS — I10 ESSENTIAL HYPERTENSION: ICD-10-CM

## 2018-02-12 DIAGNOSIS — I51.7: ICD-10-CM

## 2018-02-12 DIAGNOSIS — I34.0 MITRAL VALVE INSUFFICIENCY, UNSPECIFIED ETIOLOGY: ICD-10-CM

## 2018-02-12 DIAGNOSIS — I48.0 PAF (PAROXYSMAL ATRIAL FIBRILLATION) (HCC): Primary | ICD-10-CM

## 2018-02-12 NOTE — MR AVS SNAPSHOT
Mohini Chowdhury 
 
 
 [address] [facility] [address] [phone] Patient: Laura Hoyt MRN: [id number] :1950 Visit Information Date & Time Provider Department Dept. Phone Encounter #  
 2018 10:20 AM [place], 1024 [facility] [phone] [id number] Follow-up Instructions Return in about 6 months (around 2018). Follow-up and Disposition History Your Appointments 2018  9:30 AM  
Any with Vish Owusu MD  
[address] [place] [address]) Appt Note: 6 month f.up cp$0 1/3/18 e  
 [address]  [phone] [phone]  
  
   
 [address] [address] Upcoming Health Maintenance Date Due  
 GLAUCOMA SCREENING Q2Y 2017 Pneumococcal 65+ Low/Medium Risk (2 of 2 - PPSV23) 1/3/2019 MEDICARE YEARLY EXAM 2019 DTaP/Tdap/Td series (2 - Td) 2027 COLONOSCOPY 2027 Allergies as of 2018  Review Complete On: 2018 By: Jolanta Wong Severity Noted Reaction Type Reactions Pcn [Penicillins]  2017    Rash Current Immunizations  Never Reviewed Name Date Influenza Vaccine PF 2017 12:00 AM  
 Pneumococcal Conjugate (PCV-13) 1/3/2018 Not reviewed this visit You Were Diagnosed With   
  
 Codes Comments PAF (paroxysmal atrial fibrillation) ([facility] [address]    -  Primary ICD-10-CM: I48.0 ICD-9-CM: 427.31 Essential hypertension     ICD-10-CM: I10 
ICD-9-CM: 401.9 TAMERA (left atrial hypertrophy)     ICD-10-CM: I51.7 ICD-9-CM: 429.3 Mitral valve insufficiency, unspecified etiology     ICD-10-CM: I34.0 ICD-9-CM: 424.0 Dyslipidemia     ICD-10-CM: E78.5 ICD-9-CM: 272.4 Vitals BP Pulse Resp Height(growth percentile) Weight(growth percentile) SpO2 142/86 (BP 1 Location: Right arm, BP Patient Position: Sitting) (!) 54 14 5' 9\" (1.753 m) 184 lb (83.5 kg) 96% BMI Smoking Status 27.17 kg/m2 Former Smoker Vitals History BMI and BSA Data Body Mass Index Body Surface Area  
 27.17 kg/m 2 2.02 m 2 Preferred Pharmacy Pharmacy Name Phone [facility] [place] Tammi Krabbe [phone] Your Updated Medication List  
  
   
This list is accurate as of: 2/12/18 10:54 AM.  Always use your most recent med list. amLODIPine-benazepril 10-40 mg per capsule Commonly known as:  LOTREL  
TAKE 1 CAPSULE DAILY  
  
 * apixaban 5 mg tablet Commonly known as:  Christoph Gl[place] Take 1 Tab by mouth two (2) times a day. * apixaban 5 mg tablet Commonly known as:  Ardmore Glenside Take 1 Tab by mouth two (2) times a day. aspirin delayed-release 81 mg tablet Take 1 Tab by mouth daily. atorvastatin 10 mg tablet Commonly known as:  LIPITOR Take 1 Tab by mouth daily. carvedilol 12.5 mg tablet Commonly known as:  COREG  
TAKE 1 TABLET TWICE A DAY WITH MEALS  
  
 chlorthalidone 25 mg tablet Commonly known as:  Colleen Min Patients takes 1/2 tab q day PriLOSEC 10 mg capsule Generic drug:  omeprazole Take 10 mg by mouth daily. terazosin 2 mg capsule Commonly known as:  HYTRIN  
TAKE 1 CAPSULE NIGHTLY  
  
 zolpidem 10 mg tablet Commonly known as:  AMBIEN Take 1 Tab by mouth nightly as needed for Sleep. Max Daily Amount: 10 mg.  
  
 * Notice: This list has 2 medication(s) that are the same as other medications prescribed for you. Read the directions carefully, and ask your doctor or other care provider to review them with you. Prescriptions Sent to Pharmacy Refills  
 apixaban (ELIQUIS) 5 mg tablet 1 Sig: Take 1 Tab by mouth two (2) times a day.   
 Class: Normal  
 Pharmacy: [facility] [address] SERA CHIRINOS [place] [address] Dylan Diaz  #: [phone] Route: Oral  
  
Follow-up Instructions Return in about 6 months (around 8/12/2018). Patient Instructions STOP THE ASPIRIN. Introducing [facility]! Vineet Jenkins introduces [employer] patient portal. Now you can access parts of your medical record, email your doctor's office, and request medication refills online. 1. In your internet browser, go to [email or web address] 2. Click on the First Time User? Click Here link in the Sign In box. You will see the New Member Sign Up page. 3. Enter your [employer] Access Code exactly as it appears below. You will not need to use this code after youve completed the sign-up process. If you do not sign up before the expiration date, you must request a new code. · [employer] Access Code: [email or web address] Expires: 4/3/2018  9:47 AM 
 
4. Enter the last four digits of your Social Security Number (xxxx) and Date of Birth (mm/dd/yyyy) as indicated and click Submit. You will be taken to the next sign-up page. 5. Create a [employer] ID. This will be your [employer] login ID and cannot be changed, so think of one that is secure and easy to remember. 6. Create a [employer] password. You can change your password at any time. 7. Enter your Password Reset Question and Answer. This can be used at a later time if you forget your password. 8. Enter your e-mail address. You will receive e-mail notification when new information is available in [address] 9. Click Sign Up. You can now view and download portions of your medical record. 10. Click the Download Summary menu link to download a portable copy of your medical information. If you have questions, please visit the Frequently Asked Questions section of the [employer] website. Remember, [employer] is NOT to be used for urgent needs. For medical emergencies, dial 911. Now available from your iPhone and Android! Please provide this summary of care documentation to your next provider. Your primary care clinician is listed as Aguila Lee. If you have any questions after today's visit, please call [phone].

## 2018-02-12 NOTE — PROGRESS NOTES
Nimco Fitzpatrick is a 74 y.o. male is here to discuss test results. Hx hypertension, dyslipidemia, GERD, Hep C, cholelithiasis, renal cysts, heart murmur. Had Echo 6/16 with mild LVH, LVEF 65-70, severe LAE, mild MR. Annual wellness check earlier this month with Dr. Henry Vasques, EKG, etc. No prior hx known CAD. Holter 2/2/18 with NSR, PAC's, PVC's and short runs of PAF. Stress Treadmill EKG with Anmol to 10:11, , no sx/EKG changes. The patient denies chest pain/ shortness of breath, orthopnea, PND, LE edema, palpitations, syncope, presyncope or fatigue. Patient Active Problem List    Diagnosis Date Noted    Basal cell carcinoma 07/05/2017    Gastroesophageal reflux disease without esophagitis 06/28/2017    TAMERA (left atrial hypertrophy) 06/28/2017    Calculus of gallbladder without cholecystitis without obstruction 06/28/2017    Renal cyst 06/28/2017    Asymptomatic gallstones 06/08/2017    Multiple renal cysts 06/08/2017    Hepatitis 05/31/2017    HTN (hypertension) 05/31/2017    Essential hypertension 05/31/2017    Hep C w/o coma, chronic ([facility] [address] 05/31/2017    Murmur, heart 05/31/2017    Insomnia 05/31/2017    Benign neoplasm of skin of neck 05/31/2017    Benign non-nodular prostatic hyperplasia with lower urinary tract symptoms 05/31/2017    AK (actinic keratosis) 05/31/2017      Kathleen El MD  Past Medical History:   Diagnosis Date    Asymptomatic gallstones 6/8/2017    GERD (gastroesophageal reflux disease)     Heart murmur     Hepatitis     type C    Hypertension     Mitral regurgitation     Multiple renal cysts 6/8/2017    Multiple right renal cysts Largest 8.3 cm on ultrasound June 2017      No past surgical history on file.   Allergies   Allergen Reactions    Pcn [Penicillins] Rash      Family History   Problem Relation Age of Onset    Hypertension Mother     Cancer Father     Hypertension Father       Social History     Social History    Marital status:      Spouse name: N/A    Number of children: N/A    Years of education: N/A     Occupational History    Not on file. Social History Main Topics    Smoking status: Former Smoker     Quit date: 1/1/1986    Smokeless tobacco: Never Used    Alcohol use 6.0 oz/week     10 Cans of beer per week      Comment: social    Drug use: No    Sexual activity: Yes     Partners: Female     Birth control/ protection: None     Other Topics Concern    Not on file     Social History Narrative      Current Outpatient Prescriptions   Medication Sig    terazosin (HYTRIN) 2 mg capsule TAKE 1 CAPSULE NIGHTLY    atorvastatin (LIPITOR) 10 mg tablet Take 1 Tab by mouth daily.  amLODIPine-benazepril (LOTREL) 10-40 mg per capsule TAKE 1 CAPSULE DAILY    carvedilol (COREG) 12.5 mg tablet TAKE 1 TABLET TWICE A DAY WITH MEALS    chlorthalidone (HYGROTEN) 25 mg tablet Patients takes 1/2 tab q day    zolpidem (AMBIEN) 10 mg tablet Take 1 Tab by mouth nightly as needed for Sleep. Max Daily Amount: 10 mg.    omeprazole (PRILOSEC) 10 mg capsule Take 10 mg by mouth daily.  aspirin delayed-release 81 mg tablet Take 1 Tab by mouth daily. No current facility-administered medications for this visit. Review of Symptoms:    CONST  No weight change. No fever, chills, sweats    ENT No visual changes, URI sx, sore throat    CV  See HPI   RESP  No cough, or sputum, wheezing. Also see HPI   GI  No abdominal pain or change in bowel habits. No heartburn or dysphagia. No melena or rectal bleeding.   No dysuria, urgency, frequency, hematuria   MSKEL  No joint pain, swelling. No muscle pain. SKIN  No rash or lesions. NEURO  No headache, syncope, or seizure. No weakness, loss of sensation, or paresthesias. PSYCH  No low mood or depression  No anxiety. HE/LYMPH  No easy bruising, abnormal bleeding, or enlarged glands.         Physical ExamPhysical Exam:    There were no vitals taken for this visit.  Gen: NAD  HEENT:  PERRL, throat clear  Neck: no adenopathy, no thyromegaly, no JVD   Heart:  Regular,Nl S1S2,  I/VI murmur, no gallop or rub.   Lungs:  clear  Abdomen:   Soft, non-tender, bowel sounds are active.   Extremities:  No edema  Pulse: symmetric  Neuro: A&O times 3, No focal neuro deficits    Cardiographics    Labs:   Lab Results   Component Value Date/Time    Sodium 140 01/03/2018 10:05 AM    Sodium 140 05/31/2017 08:54 AM    Potassium 4.4 01/03/2018 10:05 AM    Potassium 4.5 05/31/2017 08:54 AM    Chloride 100 01/03/2018 10:05 AM    Chloride 98 05/31/2017 08:54 AM    CO2 28 01/03/2018 10:05 AM    CO2 27 05/31/2017 08:54 AM    Glucose 107 (H) 01/03/2018 10:05 AM    Glucose 106 (H) 05/31/2017 08:54 AM    BUN 15 01/03/2018 10:05 AM    BUN 14 05/31/2017 08:54 AM    Creatinine 0.95 01/03/2018 10:05 AM    Creatinine 0.94 05/31/2017 08:54 AM    BUN/Creatinine ratio 16 01/03/2018 10:05 AM    BUN/Creatinine ratio 15 05/31/2017 08:54 AM    GFR est AA 97 01/03/2018 10:05 AM    GFR est AA 99 05/31/2017 08:54 AM    GFR est non-AA 84 01/03/2018 10:05 AM    GFR est non-AA 85 05/31/2017 08:54 AM    Calcium 10.2 01/03/2018 10:05 AM    Calcium 10.3 (H) 05/31/2017 08:54 AM    Bilirubin, total 1.4 (H) 01/03/2018 10:05 AM    Bilirubin, total 1.1 05/31/2017 08:54 AM    AST (SGOT) 33 01/03/2018 10:05 AM    AST (SGOT) 37 05/31/2017 08:54 AM    Alk. phosphatase 65 01/03/2018 10:05 AM    Alk.  phosphatase 54 05/31/2017 08:54 AM    Protein, total 7.8 01/03/2018 10:05 AM    Protein, total 7.6 05/31/2017 08:54 AM    Albumin 4.8 01/03/2018 10:05 AM    Albumin 4.8 05/31/2017 08:54 AM    A-G Ratio 1.6 01/03/2018 10:05 AM    A-G Ratio 1.7 05/31/2017 08:54 AM    ALT (SGPT) 43 01/03/2018 10:05 AM    ALT (SGPT) 40 05/31/2017 08:54 AM     No results found for: CPK, CPKX, CPX  Lab Results   Component Value Date/Time    Cholesterol, total 185 01/03/2018 10:05 AM    Cholesterol, total 159 05/31/2017 08:54 AM    HDL Cholesterol 54 01/03/2018 10:05 AM    HDL Cholesterol 56 05/31/2017 08:54 AM    LDL, calculated 111 (H) 01/03/2018 10:05 AM    LDL, calculated 84 05/31/2017 08:54 AM    Triglyceride 99 01/03/2018 10:05 AM    Triglyceride 94 05/31/2017 08:54 AM     No results found for this or any previous visit. Assessment:         Patient Active Problem List    Diagnosis Date Noted    Basal cell carcinoma 07/05/2017    Gastroesophageal reflux disease without esophagitis 06/28/2017    TAMERA (left atrial hypertrophy) 06/28/2017    Calculus of gallbladder without cholecystitis without obstruction 06/28/2017    Renal cyst 06/28/2017    Asymptomatic gallstones 06/08/2017    Multiple renal cysts 06/08/2017    Hepatitis 05/31/2017    HTN (hypertension) 05/31/2017    Essential hypertension 05/31/2017    Hep C w/o coma, chronic ([address] 05/31/2017    Murmur, heart 05/31/2017    Insomnia 05/31/2017    Benign neoplasm of skin of neck 05/31/2017    Benign non-nodular prostatic hyperplasia with lower urinary tract symptoms 05/31/2017    AK (actinic keratosis) 05/31/2017      Hx hypertension, dyslipidemia, GERD, Hep C, cholelithiasis, renal cysts, heart murmur. Had Echo 6/16 with mild LVH, LVEF 65-70, severe LAE, mild MR. Annual wellness check earlier this month with Dr. Suzanne Sierra, EKG, etc. No prior hx known CAD. Holter 2/2/18 with NSR, PAC's, PVC's and short runs of PAF. Stress Treadmill EKG with Anmol to 10:11, , no sx/EKG changes. Plan:     CHADs2-VASC 2 (age, hypertension)--recommend anticoagulation for PAF--Eliquis 5mg bid  Ok to stop ASA  Lipids and labs followed by PCP. BP f/u with PCP  Continue current care and f/u in 6 months.     Mark Peña MD

## 2018-02-12 NOTE — PROGRESS NOTES
PATIENT ID VERIFIED WITH TWO PATIENT IDENTIFIERS. PATIENT MEDICATIONS REVIEWED AND APPROVED BY DR. Evert Neil. MEDICATIONS THAT WERE REMOVED FROM THIS VISIT HAVE BEEN APPROVED BY DR. Evert Neil. Chief Complaint   Patient presents with    Irregular Heart Beat     3 WEEK F/U---DISCUSS TEST RESULTS    Hypertension    Heart Murmur       1. Have you been to the ER, urgent care clinic since your last visit? Hospitalized since your last visit? NO  2. Have you seen or consulted any other health care providers outside of the [address] [place] [address] since your last visit?   NO

## 2018-02-20 ENCOUNTER — CLINICAL SUPPORT (OUTPATIENT)
Dept: FAMILY MEDICINE CLINIC | Age: 68
End: 2018-02-20

## 2018-02-20 DIAGNOSIS — E78.00 PURE HYPERCHOLESTEROLEMIA: ICD-10-CM

## 2018-02-21 LAB
ALT SERPL-CCNC: 42 IU/L (ref 0–44)
AST SERPL-CCNC: 35 IU/L (ref 0–40)
CHOLEST SERPL-MCNC: 110 MG/DL (ref 100–199)
HDLC SERPL-MCNC: 45 MG/DL
INTERPRETATION, 910389: NORMAL
LDLC SERPL CALC-MCNC: 51 MG/DL (ref 0–99)
TRIGL SERPL-MCNC: 71 MG/DL (ref 0–149)
VLDLC SERPL CALC-MCNC: 14 MG/DL (ref 5–40)

## 2018-02-21 RX ORDER — ATORVASTATIN CALCIUM 10 MG/1
10 TABLET, FILM COATED ORAL DAILY
Qty: 30 TAB | Refills: 5 | Status: SHIPPED | OUTPATIENT
Start: 2018-02-21 | End: 2018-03-05 | Stop reason: SDUPTHER

## 2018-02-21 NOTE — PROGRESS NOTES
Lipids much better on low-dose atorvastatin.   LFTs normal.  Patient should continue his current medications work on his diet    Keep his planned follow-up

## 2018-02-25 DIAGNOSIS — I10 ESSENTIAL HYPERTENSION: ICD-10-CM

## 2018-02-26 RX ORDER — AMLODIPINE BESYLATE AND BENAZEPRIL HYDROCHLORIDE 10; 40 MG/1; MG/1
CAPSULE ORAL
Qty: 90 CAP | Refills: 0 | Status: SHIPPED | OUTPATIENT
Start: 2018-02-26 | End: 2018-07-02 | Stop reason: SDUPTHER

## 2018-02-26 RX ORDER — CARVEDILOL 12.5 MG/1
TABLET ORAL
Qty: 180 TAB | Refills: 0 | Status: SHIPPED | OUTPATIENT
Start: 2018-02-26 | End: 2018-07-02 | Stop reason: SDUPTHER

## 2018-07-02 ENCOUNTER — OFFICE VISIT (OUTPATIENT)
Dept: FAMILY MEDICINE CLINIC | Age: 68
End: 2018-07-02

## 2018-07-02 VITALS
RESPIRATION RATE: 16 BRPM | DIASTOLIC BLOOD PRESSURE: 88 MMHG | TEMPERATURE: 97.4 F | BODY MASS INDEX: 27.11 KG/M2 | HEIGHT: 69 IN | HEART RATE: 62 BPM | SYSTOLIC BLOOD PRESSURE: 154 MMHG | OXYGEN SATURATION: 97 % | WEIGHT: 183 LBS

## 2018-07-02 DIAGNOSIS — E78.00 PURE HYPERCHOLESTEROLEMIA: ICD-10-CM

## 2018-07-02 DIAGNOSIS — C44.612 BASAL CELL CARCINOMA (BCC) OF SKIN OF RIGHT UPPER EXTREMITY INCLUDING SHOULDER: ICD-10-CM

## 2018-07-02 DIAGNOSIS — G47.00 INSOMNIA, UNSPECIFIED TYPE: ICD-10-CM

## 2018-07-02 DIAGNOSIS — N28.1 RENAL CYST: ICD-10-CM

## 2018-07-02 DIAGNOSIS — N40.1 BENIGN NON-NODULAR PROSTATIC HYPERPLASIA WITH LOWER URINARY TRACT SYMPTOMS: ICD-10-CM

## 2018-07-02 DIAGNOSIS — I10 ESSENTIAL HYPERTENSION: Primary | ICD-10-CM

## 2018-07-02 DIAGNOSIS — I48.0 PAROXYSMAL ATRIAL FIBRILLATION (HCC): ICD-10-CM

## 2018-07-02 DIAGNOSIS — B18.2 HEP C W/O COMA, CHRONIC (HCC): ICD-10-CM

## 2018-07-02 RX ORDER — TERAZOSIN 2 MG/1
CAPSULE ORAL
Qty: 90 CAP | Refills: 1 | Status: SHIPPED | OUTPATIENT
Start: 2018-07-02 | End: 2019-02-22

## 2018-07-02 RX ORDER — ZOLPIDEM TARTRATE 10 MG/1
10 TABLET ORAL
Qty: 20 TAB | Refills: 1 | Status: SHIPPED | OUTPATIENT
Start: 2018-07-02 | End: 2018-10-08 | Stop reason: SDUPTHER

## 2018-07-02 RX ORDER — CHLORTHALIDONE 25 MG/1
TABLET ORAL
Qty: 90 TAB | Refills: 1 | Status: SHIPPED | OUTPATIENT
Start: 2018-07-02 | End: 2019-02-22 | Stop reason: SDUPTHER

## 2018-07-02 RX ORDER — ATORVASTATIN CALCIUM 10 MG/1
10 TABLET, FILM COATED ORAL DAILY
Qty: 90 TAB | Refills: 1 | Status: SHIPPED | OUTPATIENT
Start: 2018-07-02 | End: 2019-02-22 | Stop reason: SDUPTHER

## 2018-07-02 RX ORDER — AMLODIPINE BESYLATE AND BENAZEPRIL HYDROCHLORIDE 10; 40 MG/1; MG/1
CAPSULE ORAL
Qty: 90 CAP | Refills: 1 | Status: SHIPPED | OUTPATIENT
Start: 2018-07-02 | End: 2019-02-22 | Stop reason: SDUPTHER

## 2018-07-02 RX ORDER — CARVEDILOL 12.5 MG/1
TABLET ORAL
Qty: 180 TAB | Refills: 1 | Status: SHIPPED | OUTPATIENT
Start: 2018-07-02 | End: 2018-07-17 | Stop reason: SDUPTHER

## 2018-07-02 NOTE — MR AVS SNAPSHOT
[facility] [address] 
[phone] Patient: Jackson Winkler MRN: [id number] :1950 Visit Information Date & Time Provider Department Dept. Phone Encounter #  
 2018  9:30 AM Julia Dee  [facility] [phone] [id number] Follow-up Instructions Return in about 1 week (around 2018) for Lesion removal.  Double appointment. Your Appointments 7/10/2018  9:30 AM  
PROCEDURE with Julia Dee MD  
[address] [place] [address] [facility]) Appt Note: remove spot on forearm $0 Cp mbm  
 [address]  
[phone]  
  
   
 [id number]ek  
  
    
 2018  3:00 PM  
ESTABLISHED PATIENT with Can Molina MD  
[address] [place] [facility]) Appt Note: 6 mo fu $0cp [address] [id number] [phone]  
  
   
 [address] [place] [address] Upcoming Health Maintenance Date Due  
 GLAUCOMA SCREENING Q2Y 2017 Influenza Age 5 to Adult 2018 Pneumococcal 65+ Low/Medium Risk (2 of 2 - PPSV23) 1/3/2019 MEDICARE YEARLY EXAM 2019 DTaP/Tdap/Td series (2 - Td) 2027 COLONOSCOPY 2027 Allergies as of 2018  Review Complete On: 2018 By: Julia Dee MD  
  
 Severity Noted Reaction Type Reactions Pcn [Penicillins]  2017    Rash Current Immunizations  Never Reviewed Name Date Influenza Vaccine PF 2017 12:00 AM  
 Pneumococcal Conjugate (PCV-13) 1/3/2018 Not reviewed this visit You Were Diagnosed With   
  
 Codes Comments Essential hypertension    -  Primary ICD-10-CM: I10 
ICD-9-CM: 401.9 Pure hypercholesterolemia     ICD-10-CM: E78.00 ICD-9-CM: 272.0 Paroxysmal atrial fibrillation (HCC)     ICD-10-CM: I48.0 ICD-9-CM: 427.31 Insomnia, unspecified type     ICD-10-CM: G47.00 ICD-9-CM: 780.52 Basal cell carcinoma (BCC) of skin of right upper extremity including shoulder     ICD-10-CM: V54.524 ICD-9-CM: 173.61 Renal cyst     ICD-10-CM: N28.1 ICD-9-CM: 753.10 Benign non-nodular prostatic hyperplasia with lower urinary tract symptoms     ICD-10-CM: N40.1 ICD-9-CM: 600.91   
 BMI 27.0-27.9,adult     ICD-10-CM: U24.50 ICD-9-CM: V85.23 Hep C w/o coma, chronic (HCC)     ICD-10-CM: B18.2 ICD-9-CM: 070.54 Vitals BP Pulse Temp Resp Height(growth percentile) Weight(growth percentile) 154/88 (BP 1 Location: Left arm, BP Patient Position: Sitting) 62 97.4 °F (36.3 °C) (Oral) 16 5' 9\" (1.753 m) 183 lb (83 kg) SpO2 BMI Smoking Status 97% 27.02 kg/m2 Former Smoker BMI and BSA Data Body Mass Index Body Surface Area  
 27.02 kg/m 2 2.01 m 2 Preferred Pharmacy Pharmacy Name Phone [facility] Sofiya Andersenesperanza [phone] Your Updated Medication List  
  
   
This list is accurate as of 7/2/18 10:16 AM.  Always use your most recent med list. amLODIPine-benazepril 10-40 mg per capsule Commonly known as:  LOTREL  
TAKE 1 CAPSULE DAILY  
  
 aspirin delayed-release 81 mg tablet Take 1 Tab by mouth daily. atorvastatin 10 mg tablet Commonly known as:  LIPITOR Take 1 Tab by mouth daily. carvedilol 12.5 mg tablet Commonly known as:  COREG  
TAKE 1 TABLET TWICE A DAY WITH MEALS  
  
 chlorthalidone 25 mg tablet Commonly known as:  Raul Odilon Patients takes 1/2 tab q day PriLOSEC 10 mg capsule Generic drug:  omeprazole Take 10 mg by mouth daily. terazosin 2 mg capsule Commonly known as:  HYTRIN  
TAKE 1 CAPSULE NIGHTLY  
  
 zolpidem 10 mg tablet Commonly known as:  AMBIEN  
 Take 1 Tab by mouth nightly as needed for Sleep. Max Daily Amount: 10 mg.  
  
  
  
  
Prescriptions Printed Refills  
 zolpidem (AMBIEN) 10 mg tablet 1 Sig: Take 1 Tab by mouth nightly as needed for Sleep. Max Daily Amount: 10 mg.  
 Class: Print Route: Oral  
  
Prescriptions Sent to Pharmacy Refills  
 terazosin (HYTRIN) 2 mg capsule 1 Sig: TAKE 1 CAPSULE NIGHTLY Class: Normal  
 Pharmacy: [facility] DR SERA TRANARTUR [facility] Ph #: [phone]  
 chlorthalidone (HYGROTEN) 25 mg tablet 1 Sig: Patients takes 1/2 tab q day Class: Normal  
 [facility] Ph #: [phone]  
 carvedilol (COREG) 12.5 mg tablet 1 Sig: TAKE 1 TABLET TWICE A DAY WITH MEALS Class: Normal  
 Pharm[facility]: [facility] Ph #: [phone]  
 atorvastatin (LIPITOR) 10 mg tablet 1 Sig: Take 1 Tab by mouth daily. Class: Normal  
 Pharmacy: [facility] Sujey Dodson Ph #: [phone] Route: Oral  
 amLODIPine-benazepril (LOTREL) 10-40 mg per capsule 1 Sig: TAKE 1 CAPSULE DAILY Class: Normal  
 Pharmacy: [facility] DR SERA PEREIRA [facility], [facility] Sujey Dodson Ph #: [phone] We Performed the Following LIPID PANEL [49570 CPT(R)] METABOLIC PANEL, COMPREHENSIVE [91306 CPT(R)] PSA, DIAGNOSTIC (PROSTATE SPECIFIC AG) U244043 CPT(R)] URINALYSIS W/ RFLX MICROSCOPIC [83188 CPT(R)] Follow-up Instructions Return in about 1 week (around 7/9/2018) for Lesion removal.  Double appointment. To-Do List   
 07/02/2018 Imaging:  US RETROPERITONEUM COMP Patient Instructions Continue current medications] Dr. Urvashi Magallanes recommend starting anticoagulation Work on diet and exercise Lab work today Keep planned follow up visit for lesion removal 
 
 
  
Introducing [facility]! Ralph Cavazos introduces [employer] patient portal. Now you can access parts of your medical record, email your doctor's office, and request medication refills online. 1. In your internet browser, go to [email or web address] 2. Click on the First Time User? Click Here link in the Sign In box. You will see the New Member Sign Up page. 3. Enter your SmartAsset Access Code exactly as it appears below. You will not need to use this code after youve completed the sign-up process. If you do not sign up before the expiration date, you must request a new code. · Smart[employer] Access Code: [email or web address] Expires: 9/30/2018 10:03 AM 
 
4. Enter the last four digits of your Social Security Number (xxxx) and Date of Birth (mm/dd/yyyy) as indicated and click Submit. You will be taken to the next sign-up page. 5. Create a SmartAsset ID. This will be your SmartAsset login ID and cannot be changed, so think of one that is secure and easy to remember. 6. Create a [employer] password. You can change your password at any time. 7. Enter your Password Reset Question and Answer. This can be used at a later time if you forget your password. 8. Enter your e-mail address. You will receive e-mail notification when new information is available in [address] 9. Click Sign Up. You can now view and download portions of your medical record. 10. Click the Download Summary menu link to download a portable copy of your medical information. If you have questions, please visit the Frequently Asked Questions section of the [employer] website. Remember, SmartAsset is NOT to be used for urgent needs. For medical emergencies, dial 911. Now available from your iPhone and Android! Please provide this summary of care documentation to your next provider. Your primary care clinician is listed as Bethany Severin. If you have any questions after today's visit, please call [phone].

## 2018-07-02 NOTE — PROGRESS NOTES
Chief Complaint   Patient presents with    Hypertension     6 mo follow up     Visit Vitals    /88 (BP 1 Location: Left arm, BP Patient Position: Sitting)    Pulse 62    Temp 97.4 °F (36.3 °C) (Oral)    Resp 16    Ht 5' 9\" (1.753 m)    Wt 183 lb (83 kg)    SpO2 97%    BMI 27.02 kg/m2     Health Maintenance Due   Topic Date Due    GLAUCOMA SCREENING Q2Y  12/24/2017     1. Have you been to the ER, urgent care clinic since your last visit? Hospitalized since your last visit? No    2. Have you seen or consulted any other health care providers outside of the [address] [place] [address] since your last visit? Include any pap smears or colon screening.  No

## 2018-07-02 NOTE — PATIENT INSTRUCTIONS
Continue current medications]  Dr. Dilcia Perry recommend starting anticoagulation    Work on diet and exercise    Lab work today    Keep planned follow up visit for lesion removal

## 2018-07-02 NOTE — PROGRESS NOTES
Ky Mccrary is a 74 y.o. male who presents to the office today with the following:  Chief Complaint   Patient presents with    Hypertension     6 mo follow up       Allergies   Allergen Reactions    Pcn [Penicillins] Rash       Current Outpatient Prescriptions   Medication Sig    terazosin (HYTRIN) 2 mg capsule TAKE 1 CAPSULE NIGHTLY    chlorthalidone (HYGROTEN) 25 mg tablet Patients takes 1/2 tab q day    carvedilol (COREG) 12.5 mg tablet TAKE 1 TABLET TWICE A DAY WITH MEALS    atorvastatin (LIPITOR) 10 mg tablet Take 1 Tab by mouth daily.  amLODIPine-benazepril (LOTREL) 10-40 mg per capsule TAKE 1 CAPSULE DAILY    zolpidem (AMBIEN) 10 mg tablet Take 1 Tab by mouth nightly as needed for Sleep. Max Daily Amount: 10 mg.    omeprazole (PRILOSEC) 10 mg capsule Take 10 mg by mouth daily.  aspirin delayed-release 81 mg tablet Take 1 Tab by mouth daily. No current facility-administered medications for this visit. Past Medical History:   Diagnosis Date    Asymptomatic gallstones 6/8/2017    GERD (gastroesophageal reflux disease)     Heart murmur     Hepatitis     type C    Hypertension     Mitral regurgitation     Multiple renal cysts 6/8/2017    Multiple right renal cysts Largest 8.3 cm on ultrasound June 2017       No past surgical history on file. History   Smoking Status    Former Smoker    Quit date: 1/1/1986   Smokeless Tobacco    Never Used       Family History   Problem Relation Age of Onset    Hypertension Mother     Cancer Father     Hypertension Father          History of Present Illness:  Patient here for  ongoing medical follow-up        Patient with a 10 year history of hypertension. Compliant with his current regimen. Blood pressure has been in good control both in our office and at home with systolics in the 131 range. Blood pressure up a bit today. He would prefer not to increase his regiment.   Most recent basic metabolic profile normal.  EKG 2018 shows sinus rhythm with poor R-wave progression across the anterior leads but no acute changes. Ultrasound abdominal aorta 6/17 negative for aneurysm. He denies any chest pain pressure or dyspnea. He is is on baby aspirin daily    I did note a heart murmur previously. We did an echocardiogram.  This showed minimal mitral regurg. Also showed some impaired diastolic relaxation. Cardiology did note severe left atrial enlargement. Since the last time I saw patient I did refer him to cardiology. They did review his echocardiogram.  They did a stress test that was normal.  They did a Holter monitor which showed some PACs, PVCs and short runs of atrial fibrillation. They recommended and wrote for Eliquis for the patient. He never started this medication. He is concerned about the side effects of bleeding since he has had a history of nosebleeds in the past.  We spent a great deal of time today talking about the risks of atrial fibrillation and treatment options including Coumadin or Eliquis. Patient is aware but at the moment wants to stay with his aspirin daily. He is aware this does not decrease the risk of stroke from atrial fibrillation. I asked him to reconsider and he will let me know if he changes his mind    Because of his overall cardiac risk he is now on Lipitor therapy. Most recent lipid panel excellent with normal LFTs. Repeat lab work ordered today      Patient has a history of hepatitis C. Diagnosed about 10 years ago. He did undergo liver biopsy in's and some additional testing at that point. He states they did not feel further treatment was indicated then. He is aware that there are new treatments available now Blood work from June 2017 confirms ongoing hepatitis C. LFTs are normal.  I have recommended referral back to hepatology for consideration of treatment options. Patient is aware but he declines at present. Ultrasound right upper quadrant revealed normal liver.   He again declines any referral for additional treatment    Abdominal ultrasound also revealed gallstones. Gallbladder was not inflamed. He has no symptoms referable to his gallbladder. We will follow. He will notify me if he develops any symptoms. Patient was found to have renal cysts on the right. Largest 8.3 cm. Nonobstructing. Repeat ultrasound ordered today    Patient does have a history of GERD. He has had peptic ulcer disease in the past.  He has had an EGD in the past.  He continues on low-dose omeprazole. He states he gets symptoms if he tries to stop this medication. He has tried Zantac in the past but that does not control his symptoms. We did discuss risks of long-term PPI use. He wishes to continue his current regimen    Patient does take as needed Ambien. 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 Does not take a regular basis. He did ask for refill today. Patient has a history of sun exposure and actinic keratosis which I frozen in the past.  Patient did have a pearly lesion right forearm today as well as some actinic keratoses on his temple. I am bringing her back for excision of the lesion on his arm and liquid nitrogen treatment of the actinic keratoses      Patient does have BPH. No personal or family history of prostate cancer. He was up 2-3 times a night to urinate. He does have some hesitancy during the day. No incontinence. He previously was tried on Rapaflo but had some side effects from this. Prostate exam 6/17 revealed an enlarged prostate without nodules. PSA was 2.4 normal.  We did start patient on Hytrin both for blood pressure control and BPH. He states that is worked well. His urinary symptoms have improved. Repeat PSA ordered today      Past medical history is otherwise negative     patient had negative colonoscopy 2017. He was told he could go 10 years for his next colonoscopy.   No family history of colon cancer    Patient had a tetanus booster within the last 10 years. He has had the Zostavax. Prevnar was given 12/17 flu shot earlier this season      Review of Systems:    Review of systems negative except as noted above      Physical Exam:  Visit Vitals    /88 (BP 1 Location: Left arm, BP Patient Position: Sitting)    Pulse 62    Temp 97.4 °F (36.3 °C) (Oral)    Resp 16    Ht 5' 9\" (1.753 m)    Wt 183 lb (83 kg)    SpO2 97%    BMI 27.02 kg/m2     Vitals:    07/02/18 0925   BP: 154/88   BP 1 Location: Left arm   BP Patient Position: Sitting   Pulse: 62   Resp: 16   Temp: 97.4 °F (36.3 °C)   TempSrc: Oral   SpO2: 97%   Weight: 183 lb (83 kg)   Height: 5' 9\" (1.753 m)     Patient no acute distress vital signs stable as above on repeat  Head is normocephalic  External ears normal.  Ear canals normal TMs were clear hearing was normal  Eyes PERRLA. EOMs full. Sclera clear patient does see his eye doctor regularly. We are getting those records  Nose within normal limits. Nares  normal.  No significant congestion  OP mucosa normal, no obvious lesions. Pharynx normal.  No erythema or exudate. Structures midline. Patient follows with his dentist regularly  Neck no nodes no masses no bruits  Chest was clear no wheezes rhonchi or rales. Good air exchange  Cor regular rate and rhythm no S3-S4 no murmurs appreciated today. No ectopy  Abdomen no HSM no masses soft and was nontender  Extremities no edema. Nontender. Good range of motion  Right forearm has a scabbed somewhat pearly lesion consistent with a basal cell  Gait and gross neurologic exam were normal  EKG showed normal sinus rhythm with poor R-wave progression anterior leads    1. Essential hypertension  Historically in good control. Blood pressure is up a bit today. Continuing his current medication. Checking lab work.   We will recheck his blood pressure when he is back for follow-up and adjust medications as needed  - METABOLIC PANEL, COMPREHENSIVE  - URINALYSIS W/ RFLX MICROSCOPIC  - terazosin (HYTRIN) 2 mg capsule; TAKE 1 CAPSULE NIGHTLY  Dispense: 90 Cap; Refill: 1  - chlorthalidone (HYGROTEN) 25 mg tablet; Patients takes 1/2 tab q day  Dispense: 90 Tab; Refill: 1  - carvedilol (COREG) 12.5 mg tablet; TAKE 1 TABLET TWICE A DAY WITH MEALS  Dispense: 180 Tab; Refill: 1  - amLODIPine-benazepril (LOTREL) 10-40 mg per capsule; TAKE 1 CAPSULE DAILY  Dispense: 90 Cap; Refill: 1    2. Pure hypercholesterolemia  Checking lab work on current regiment  - LIPID PANEL  - atorvastatin (LIPITOR) 10 mg tablet; Take 1 Tab by mouth daily. Dispense: 90 Tab; Refill: 1    3. Paroxysmal atrial fibrillation [facility])  Patient declining oral anticoagulation aware of the risks    4. Insomnia, unspecified type    - zolpidem (AMBIEN) 10 mg tablet; Take 1 Tab by mouth nightly as needed for Sleep. Max Daily Amount: 10 mg. Dispense: 20 Tab; Refill: 1    5. Basal cell carcinoma (BCC) of skin of right upper extremity including shoulder  Bringing patient back for excision    6. Renal cyst    - US RETROPERITONEUM COMP; Future    7. Benign non-nodular prostatic hyperplasia with lower urinary tract symptoms    - PROSTATE SPECIFIC AG  - terazosin (HYTRIN) 2 mg capsule; TAKE 1 CAPSULE NIGHTLY  Dispense: 90 Cap; Refill: 1    8. BMI 27.0-27.9,adult  We discussed importance of healthy diet and exercise    9. Hep C w/o coma, chronic (HCC)  Normal LFTs. Patient declining referral for treatment    Patient Instructions   Continue current medications]  Dr. Vanessa Mejia recommend starting anticoagulation    Work on diet and exercise    Lab work today    Keep planned follow up visit for lesion removal      Patient Instructions   Continue current medications]  Dr. Vanessa Mejia recommend starting anticoagulation    Work on diet and exercise    Lab work today    Keep planned follow up visit for lesion removal          Continue current therapy plan except for indicated above.  Verbal and written instructions (see AVS) provided.  Patient expresses understanding of diagnosis and treatment plan. Follow-up Disposition:  Return in about 1 week (around 7/9/2018) for Lesion removal.  Double appointment. Yary Aguilar.  Cindy Abraham MD

## 2018-07-03 LAB
ALBUMIN SERPL-MCNC: 4.6 G/DL (ref 3.6–4.8)
ALBUMIN/GLOB SERPL: 1.7 {RATIO} (ref 1.2–2.2)
ALP SERPL-CCNC: 56 IU/L (ref 39–117)
ALT SERPL-CCNC: 40 IU/L (ref 0–44)
APPEARANCE UR: CLEAR
AST SERPL-CCNC: 34 IU/L (ref 0–40)
BILIRUB SERPL-MCNC: 1.1 MG/DL (ref 0–1.2)
BILIRUB UR QL STRIP: NEGATIVE
BUN SERPL-MCNC: 14 MG/DL (ref 8–27)
BUN/CREAT SERPL: 16 (ref 10–24)
CALCIUM SERPL-MCNC: 9.4 MG/DL (ref 8.6–10.2)
CHLORIDE SERPL-SCNC: 104 MMOL/L (ref 96–106)
CHOLEST SERPL-MCNC: 110 MG/DL (ref 100–199)
CO2 SERPL-SCNC: 24 MMOL/L (ref 20–29)
COLOR UR: YELLOW
CREAT SERPL-MCNC: 0.85 MG/DL (ref 0.76–1.27)
GLOBULIN SER CALC-MCNC: 2.7 G/DL (ref 1.5–4.5)
GLUCOSE SERPL-MCNC: 104 MG/DL (ref 65–99)
GLUCOSE UR QL: NEGATIVE
HDLC SERPL-MCNC: 45 MG/DL
HGB UR QL STRIP: NEGATIVE
INTERPRETATION, 910389: NORMAL
KETONES UR QL STRIP: NEGATIVE
LDLC SERPL CALC-MCNC: 49 MG/DL (ref 0–99)
LEUKOCYTE ESTERASE UR QL STRIP: NEGATIVE
MICRO URNS: NORMAL
NITRITE UR QL STRIP: NEGATIVE
PH UR STRIP: 5.5 [PH] (ref 5–7.5)
POTASSIUM SERPL-SCNC: 4.3 MMOL/L (ref 3.5–5.2)
PROT SERPL-MCNC: 7.3 G/DL (ref 6–8.5)
PROT UR QL STRIP: NEGATIVE
PSA SERPL-MCNC: 1.7 NG/ML (ref 0–4)
SODIUM SERPL-SCNC: 144 MMOL/L (ref 134–144)
SP GR UR: 1.01 (ref 1–1.03)
TRIGL SERPL-MCNC: 82 MG/DL (ref 0–149)
UROBILINOGEN UR STRIP-MCNC: 1 MG/DL (ref 0.2–1)
VLDLC SERPL CALC-MCNC: 16 MG/DL (ref 5–40)

## 2018-07-10 ENCOUNTER — OFFICE VISIT (OUTPATIENT)
Dept: FAMILY MEDICINE CLINIC | Age: 68
End: 2018-07-10

## 2018-07-10 VITALS
HEART RATE: 63 BPM | WEIGHT: 182.6 LBS | HEIGHT: 69 IN | SYSTOLIC BLOOD PRESSURE: 144 MMHG | RESPIRATION RATE: 16 BRPM | BODY MASS INDEX: 27.05 KG/M2 | DIASTOLIC BLOOD PRESSURE: 90 MMHG | TEMPERATURE: 98.2 F

## 2018-07-10 DIAGNOSIS — D04.61 CARCINOMA IN SITU OF SKIN OF RIGHT UPPER EXTREMITY INCLUDING SHOULDER: ICD-10-CM

## 2018-07-10 DIAGNOSIS — I10 ESSENTIAL HYPERTENSION: ICD-10-CM

## 2018-07-10 DIAGNOSIS — L57.0 AK (ACTINIC KERATOSIS): ICD-10-CM

## 2018-07-10 DIAGNOSIS — L98.9 SKIN LESION OF RIGHT ARM: Primary | ICD-10-CM

## 2018-07-10 NOTE — PROGRESS NOTES
Chief Complaint   Patient presents with    Lesion     removal on forearm     Visit Vitals    /90 (BP 1 Location: Right arm, BP Patient Position: Sitting)    Pulse 63    Temp 98.2 °F (36.8 °C) (Oral)    Resp 16    Ht 5' 9\" (1.753 m)    Wt 182 lb 9.6 oz (82.8 kg)    BMI 26.97 kg/m2     1. Have you been to the ER, urgent care clinic since your last visit? Hospitalized since your last visit? No    2. Have you seen or consulted any other health care providers outside of the [facility] since your last visit? Include any pap smears or colon screening. No   Pamela Starks LPN    [facility]  OFFICE PROCEDURE PROGRESS NOTE        Chart reviewed for the following:   Vandana Mcclure LPN, have reviewed the History, Physical and updated the Allergic reactions for [address] performed immediately prior to start of procedure:   Vandana Mcclure LPN, have performed the following reviews on Madeline Rowe prior to the start of the procedure:            * Patient was identified by name and date of birth   * Agreement on procedure being performed was verified  * Risks and Benefits explained to the patient by Marlyse Holter, MD  * Procedure site verified and marked as necessary  * Patient was positioned for comfort  * Consent was signed and verified     Time: 9:45      Date of procedure: 7/10/2018    Procedure performed by:  Marlyse Holter, MD    Provider assisted by: SPRING Dyer LPN    Patient assisted by: SPRING Dyer LPN    Pre Procedural Pain Scale: 0    How tolerated by patient: good    Post Procedural Pain Scale:     Comments    Post op instructions and patient education reviewed and copy given to patient in AVS.

## 2018-07-10 NOTE — PROGRESS NOTES
Ky Mccrary is a 74 y.o. male who presents to the office today with the following:  Chief Complaint   Patient presents with    Lesion     removal on forearm       Allergies   Allergen Reactions    Pcn [Penicillins] Rash       Current Outpatient Prescriptions   Medication Sig    terazosin (HYTRIN) 2 mg capsule TAKE 1 CAPSULE NIGHTLY    chlorthalidone (HYGROTEN) 25 mg tablet Patients takes 1/2 tab q day    carvedilol (COREG) 12.5 mg tablet TAKE 1 TABLET TWICE A DAY WITH MEALS    atorvastatin (LIPITOR) 10 mg tablet Take 1 Tab by mouth daily.  amLODIPine-benazepril (LOTREL) 10-40 mg per capsule TAKE 1 CAPSULE DAILY    zolpidem (AMBIEN) 10 mg tablet Take 1 Tab by mouth nightly as needed for Sleep. Max Daily Amount: 10 mg.    omeprazole (PRILOSEC) 10 mg capsule Take 10 mg by mouth daily.  aspirin delayed-release 81 mg tablet Take 1 Tab by mouth daily. No current facility-administered medications for this visit. Past Medical History:   Diagnosis Date    Asymptomatic gallstones 6/8/2017    GERD (gastroesophageal reflux disease)     Heart murmur     Hepatitis     type C    Hypertension     Mitral regurgitation     Multiple renal cysts 6/8/2017    Multiple right renal cysts Largest 8.3 cm on ultrasound June 2017       History reviewed. No pertinent surgical history. History   Smoking Status    Former Smoker    Quit date: 1/1/1986   Smokeless Tobacco    Never Used       Family History   Problem Relation Age of Onset    Hypertension Mother     Cancer Father     Hypertension Father          History of Present Illness:  Patient here for lesion removal    Patient with a history of actinic sun damage and skin cancers removed in the past.  Had a recent physical and was found to have a scabbed pearly lesion right forearm. Here today for lesion removal    He had a few lesions marked on his back. These were benign seborrheic keratosis or tiny benign pigmented nevi.   There was one scaly patch right upper shoulder consistent with an actinic keratosis and this was frozen as well    Patient does have hypertension. Blood pressures borderline at previous visit. Improved in office today. Patient reports good control at home with systolics in the 423-387 range      Review of Systems:    Review of systems negative except as noted above    Physical Exam:  Visit Vitals    Ht 5' 9\" (1.753 m)     Vitals:    07/10/18 0927   Height: 5' 9\" (1.753 m)     Patient no acute distress    Right forearm patient had a    1 cm     raised slightly pearly lesion consistent with a basal cell  Office course: Risks and benefits of procedure discussed with patient  Signed consent obtained  Area was cleaned with Betadine and sterilely draped  Anesthetized with 2% Xylocaine with epi  Excised elliptically using a blade  Sample sent to pathology  Area closed with 5-0 Ethilon times 4 with good approximation of the wound edges     Good hemostasis  Patient tolerated the procedure well  Sterile dressing applied  Wound care was discussed with patient and he verbalized understanding    Patient with some scaly actinic patches both temples and right shoulder. As noted above these were frozen with liquid nitrogen        1. Skin lesion of right arm  Excisional biopsy as noted above. Wound care was discussed with patient. He will be back in 1 week for suture removal and discussion of the biopsy report    2. Essential hypertension  In good control at home with a blood pressure cuff that is been correlated with our office machine    3. AK (actinic keratosis)  Frozen with liquid nitrogen as noted above. Sun precautions discussed with patient      Patient Instructions   Keep area clean and dry  Follow-up for any signs of infection  Suture removal 1 week        Continue current therapy plan except for indicated above. Verbal and written instructions (see AVS) provided.  Patient expresses understanding of diagnosis and treatment plan.     Follow-up Disposition:  Return in about 1 week (around 7/17/2018) for Suture removal.        Rae Cummings MD        Patient also wanted me to check several other lesions. He did have some scaly patches on his temples consistent with actinic keratoses. These were frozen with liquid nitrogen.

## 2018-07-10 NOTE — MR AVS SNAPSHOT
[facility] 
[phone] Patient: Sohan Mckeon MRN: [id number] :1950 Visit Information Date & Time Provider Department Dept. Phone Encounter #  
 7/10/2018  9:30 AM Marcelo Hernandez MD [address] [place] [address] [phone] [id number] Follow-up Instructions Return in about 1 week (around 2018) for Suture removal. Upcoming Health Maintenance Date Due  
 GLAUCOMA SCREENING Q2Y 2017 Influenza Age 5 to Adult 2018 Pneumococcal 65+ Low/Medium Risk (2 of 2 - PPSV23) 1/3/2019 MEDICARE YEARLY EXAM 2019 DTaP/Tdap/Td series (2 - Td) 2027 COLONOSCOPY 2027 Allergies as of 7/10/2018  Review Complete On: 7/10/2018 By: Adela Esparza LPN Severity Noted Reaction Type Reactions Pcn [Penicillins]  2017    Rash Current Immunizations  Never Reviewed Name Date Influenza Vaccine PF 2017 12:00 AM  
 Pneumococcal Conjugate (PCV-13) 1/3/2018 Not reviewed this visit You Were Diagnosed With   
  
 Codes Comments Skin lesion of right arm    -  Primary ICD-10-CM: L98.9 ICD-9-CM: 709.9 Essential hypertension     ICD-10-CM: I10 
ICD-9-CM: 401.9 Vitals BP Pulse Temp Resp Height(growth percentile) Weight(growth percentile) 144/90 (BP 1 Location: Right arm, BP Patient Position: Sitting) 63 98.2 °F (36.8 °C) (Oral) 16 5' 9\" (1.753 m) 182 lb 9.6 oz (82.8 kg) BMI Smoking Status 26.97 kg/m2 Former Smoker Vitals History BMI and BSA Data Body Mass Index Body Surface Area  
 26.97 kg/m 2 2.01 m 2 Preferred Pharmacy Pharmacy Name Phone [facility] Kenney Harkins [phone] Your Updated Medication List  
  
   
This list is accurate as of 7/10/18 10:06 AM.  Always use your most recent med list.  
  
  
  
  
 amLODIPine-benazepril 10-40 mg per capsule Commonly known as:  LOTREL  
TAKE 1 CAPSULE DAILY  
  
 aspirin delayed-release 81 mg tablet Take 1 Tab by mouth daily. atorvastatin 10 mg tablet Commonly known as:  LIPITOR Take 1 Tab by mouth daily. carvedilol 12.5 mg tablet Commonly known as:  COREG  
TAKE 1 TABLET TWICE A DAY WITH MEALS  
  
 chlorthalidone 25 mg tablet Commonly known as:  Andree Folk Patients takes 1/2 tab q day PriLOSEC 10 mg capsule Generic drug:  omeprazole Take 10 mg by mouth daily. terazosin 2 mg capsule Commonly known as:  HYTRIN  
TAKE 1 CAPSULE NIGHTLY  
  
 zolpidem 10 mg tablet Commonly known as:  AMBIEN Take 1 Tab by mouth nightly as needed for Sleep. Max Daily Amount: 10 mg. Follow-up Instructions Return in about 1 week (around 7/17/2018) for Suture removal.  
  
To-Do List   
 07/11/2018 8:00 AM  
  Appointment with Craig Lemoyne [place] [facility] at [place] [address] ([phone]) DIET RESTRICTIONS - Nothing to eat or drink 8 hours prior to exam except sips of water with routine medications. - Drink 32 oz. clear liquids or water 1 hour prior to exam. - DO NOT VOID BEFORE EXAM.   GENERAL INSTRUCTIONS - If you have a hand-written prescription for this exam, you must bring it with you on the day of your exam. - Bring all relevant prior images from facilities outside of [facility]. Failure to provide images may delay reading by Radiologist. - Children under the age of 15 may not be left unattended. - [address] [place] [address] patients must have an armband and be accompanied by a caregiver or family member. - Onset of menses does not interfere with the ultrasound exam.  APPOINTMENT CANCELLATION - To reschedule or cancel an appointment, please contact the Scheduling Department at [phone]. Patient Instructions Keep area clean and dry Follow-up for any signs of infection Suture removal 1 week Introducing [facility]! Nayely Max introduces [employer] patient portal. Now you can access parts of your medical record, email your doctor's office, and request medication refills online. 1. In your internet browser, go to [email or web address] 2. Click on the First Time User? Click Here link in the Sign In box. You will see the New Member Sign Up page. 3. Enter your [employer] Access Code exactly as it appears below. You will not need to use this code after youve completed the sign-up process. If you do not sign up before the expiration date, you must request a new code. · [employer] Access Code: [email or web address] Expires: 9/30/2018 10:03 AM 
 
4. Enter the last four digits of your Social Security Number (xxxx) and Date of Birth (mm/dd/yyyy) as indicated and click Submit. You will be taken to the next sign-up page. 5. Create a [employer] ID. This will be your [employer] login ID and cannot be changed, so think of one that is secure and easy to remember. 6. Create a [employer] password. You can change your password at any time. 7. Enter your Password Reset Question and Answer. This can be used at a later time if you forget your password. 8. Enter your e-mail address. You will receive e-mail notification when new information is available in [address] 9. Click Sign Up. You can now view and download portions of your medical record. 10. Click the Download Summary menu link to download a portable copy of your medical information. If you have questions, please visit the Frequently Asked Questions section of the [employer] website. Remember, [employer] is NOT to be used for urgent needs. For medical emergencies, dial 911. Now available from your iPhone and Android! Please provide this summary of care documentation to your next provider. Your primary care clinician is listed as Lisa Benz. If you have any questions after today's visit, please call [phone].

## 2018-07-11 ENCOUNTER — TELEPHONE (OUTPATIENT)
Dept: FAMILY MEDICINE CLINIC | Age: 68
End: 2018-07-11

## 2018-07-17 ENCOUNTER — OFFICE VISIT (OUTPATIENT)
Dept: FAMILY MEDICINE CLINIC | Age: 68
End: 2018-07-17

## 2018-07-17 VITALS
TEMPERATURE: 98 F | RESPIRATION RATE: 18 BRPM | BODY MASS INDEX: 26.81 KG/M2 | WEIGHT: 181 LBS | DIASTOLIC BLOOD PRESSURE: 90 MMHG | SYSTOLIC BLOOD PRESSURE: 160 MMHG | OXYGEN SATURATION: 98 % | HEIGHT: 69 IN | HEART RATE: 77 BPM

## 2018-07-17 DIAGNOSIS — C44.612 BASAL CELL CARCINOMA (BCC) OF SKIN OF RIGHT UPPER EXTREMITY INCLUDING SHOULDER: ICD-10-CM

## 2018-07-17 DIAGNOSIS — Z48.02 VISIT FOR SUTURE REMOVAL: Primary | ICD-10-CM

## 2018-07-17 DIAGNOSIS — I10 ESSENTIAL HYPERTENSION: ICD-10-CM

## 2018-07-17 DIAGNOSIS — L57.0 AK (ACTINIC KERATOSIS): ICD-10-CM

## 2018-07-17 RX ORDER — CARVEDILOL 12.5 MG/1
TABLET ORAL
Qty: 180 TAB | Refills: 1
Start: 2018-07-17 | End: 2019-07-29 | Stop reason: DRUGHIGH

## 2018-07-17 RX ORDER — DOXYCYCLINE 100 MG/1
100 TABLET ORAL 2 TIMES DAILY
Qty: 20 TAB | Refills: 0 | Status: SHIPPED | OUTPATIENT
Start: 2018-07-17 | End: 2018-07-27

## 2018-07-17 NOTE — PATIENT INSTRUCTIONS
Same meds  Doxycycline x 10 days  Increase carvedalol to 2 AM 1 PM  Recheck 1 month    Referral to dermatology

## 2018-07-17 NOTE — MR AVS SNAPSHOT
[facility] 
[phone] Patient: Manish Ellis MRN: [id number] :1950 Visit Information Date & Time Provider Department Dept. Phone Encounter #  
 2018  4:40 PM Brandan Christine MD [address] [place] [address] [phone] [id number] Follow-up Instructions Return in about 1 month (around 2018). Your Appointments 2018  7:20 AM  
Any with Brandan Christine MD  
[address] [place] [address] [facility]) Appt Note: 1 mo f/u $0 CP mbm  
 [phone]  
[phone]  
  
   
 [address] [address] Upcoming Health Maintenance Date Due  
 GLAUCOMA SCREENING Q2Y 2017 Influenza Age 5 to Adult 2018 Pneumococcal 65+ Low/Medium Risk (2 of 2 - PPSV23) 1/3/2019 MEDICARE YEARLY EXAM 2019 DTaP/Tdap/Td series (2 - Td) 2027 COLONOSCOPY 2027 Allergies as of 2018  Review Complete On: 2018 By: Evelin Damon LPN Severity Noted Reaction Type Reactions Pcn [Penicillins]  2017    Rash Current Immunizations  Never Reviewed Name Date Influenza Vaccine PF 2017 12:00 AM  
 Pneumococcal Conjugate (PCV-13) 1/3/2018 Not reviewed this visit You Were Diagnosed With   
  
 Codes Comments Visit for suture removal    -  Primary ICD-10-CM: G84.37 
ICD-9-CM: V58.32 Essential hypertension     ICD-10-CM: I10 
ICD-9-CM: 401.9 Vitals BP Pulse Temp Resp Height(growth percentile) Weight(growth percentile) 160/90 77 98 °F (36.7 °C) (Oral) 18 5' 9\" (1.753 m) 181 lb (82.1 kg) SpO2 BMI Smoking Status 98% 26.73 kg/m2 Former Smoker Vitals History BMI and BSA Data Body Mass Index Body Surface Area  
 26.73 kg/m 2 2 m 2 Preferred Pharmacy Pharmacy Name Phone [facility] RandiKenney Caro [phone] Your Updated Medication List  
  
   
This list is accurate as of 7/17/18  4:45 PM.  Always use your most recent med list. amLODIPine-benazepril 10-40 mg per capsule Commonly known as:  LOTREL  
TAKE 1 CAPSULE DAILY  
  
 aspirin delayed-release 81 mg tablet Take 1 Tab by mouth daily. atorvastatin 10 mg tablet Commonly known as:  LIPITOR Take 1 Tab by mouth daily. carvedilol 12.5 mg tablet Commonly known as:  COREG  
TAKE 2 AM  1 PM  
  
 chlorthalidone 25 mg tablet Commonly known as:  Suzen Pay Patients takes 1/2 tab q day  
  
 doxycycline 100 mg tablet Commonly known as:  ADOXA Take 1 Tab by mouth two (2) times a day for 10 days. PriLOSEC 10 mg capsule Generic drug:  omeprazole Take 10 mg by mouth daily. terazosin 2 mg capsule Commonly known as:  HYTRIN  
TAKE 1 CAPSULE NIGHTLY  
  
 zolpidem 10 mg tablet Commonly known as:  AMBIEN Take 1 Tab by mouth nightly as needed for Sleep. Max Daily Amount: 10 mg.  
  
  
  
  
Prescriptions Sent to Pharmacy Refills  
 doxycycline (ADOXA) 100 mg tablet 0 Sig: Take 1 Tab by mouth two (2) times a day for 10 days. Class: Normal  
 Pharmacy: [facility] DR SERA CHIRINOS [facility]: [phone] Route: Oral  
  
Follow-up Instructions Return in about 1 month (around 8/17/2018). Patient Instructions Same meds Doxycycline x 10 days Increase carvedalol to 2 AM 1 PM 
Recheck 1 month Referral to dermatology [facility]! Marizol Beal introduces [employer] patient portal. Now you can access parts of your medical record, email your doctor's office, and request medication refills online. 1. In your internet browser, go to [email or web address] 2. Click on the First Time User? Click Here link in the Sign In box.  You will see the New Member Sign Up page. 3. Enter your [employer] Access Code exactly as it appears below. You will not need to use this code after youve completed the sign-up process. If you do not sign up before the expiration date, you must request a new code. · [employer] Access Code: [email or web address] Expires: 9/30/2018 10:03 AM 
 
4. Enter the last four digits of your Social Security Number (xxxx) and Date of Birth (mm/dd/yyyy) as indicated and click Submit. You will be taken to the next sign-up page. 5. Create a [employer] ID. This will be your [employer] login ID and cannot be changed, so think of one that is secure and easy to remember. 6. Create a [employer] password. You can change your password at any time. 7. Enter your Password Reset Question and Answer. This can be used at a later time if you forget your password. 8. Enter your e-mail address. You will receive e-mail notification when new information is available in [address] 9. Click Sign Up. You can now view and download portions of your medical record. 10. Click the Download Summary menu link to download a portable copy of your medical information. If you have questions, please visit the Frequently Asked Questions section of the [employer] website. Remember, [employer] is NOT to be used for urgent needs. For medical emergencies, dial 911. Now available from your iPhone and Android! Please provide this summary of care documentation to your next provider. Your primary care clinician is listed as Silvia Peraza. If you have any questions after today's visit, please call [phone].

## 2018-08-16 ENCOUNTER — OFFICE VISIT (OUTPATIENT)
Dept: FAMILY MEDICINE CLINIC | Age: 68
End: 2018-08-16

## 2018-08-16 VITALS
HEIGHT: 69 IN | WEIGHT: 181.6 LBS | DIASTOLIC BLOOD PRESSURE: 80 MMHG | SYSTOLIC BLOOD PRESSURE: 138 MMHG | RESPIRATION RATE: 16 BRPM | BODY MASS INDEX: 26.9 KG/M2 | HEART RATE: 53 BPM | OXYGEN SATURATION: 95 % | TEMPERATURE: 97 F

## 2018-08-16 DIAGNOSIS — I10 ESSENTIAL HYPERTENSION: Primary | ICD-10-CM

## 2018-08-16 DIAGNOSIS — I48.0 PAROXYSMAL ATRIAL FIBRILLATION (HCC): ICD-10-CM

## 2018-08-16 DIAGNOSIS — N40.0 BENIGN PROSTATIC HYPERPLASIA WITHOUT LOWER URINARY TRACT SYMPTOMS: ICD-10-CM

## 2018-08-16 DIAGNOSIS — C44.612 BASAL CELL CARCINOMA (BCC) OF SKIN OF RIGHT UPPER EXTREMITY INCLUDING SHOULDER: ICD-10-CM

## 2018-08-16 NOTE — PROGRESS NOTES
Shyla [place] is a 74 y.o. male who presents to the office today with the following:  Chief Complaint   Patient presents with    Hypertension     1 mo f/u med increase       Allergies   Allergen Reactions    Pcn [Penicillins] Rash       Current Outpatient Prescriptions   Medication Sig    carvedilol (COREG) 12.5 mg tablet TAKE 2 AM  1 PM (Patient taking differently: TAKE 2 AM  1 PM  Indications: pt taking 1 in am, 2 in pm)    terazosin (HYTRIN) 2 mg capsule TAKE 1 CAPSULE NIGHTLY    chlorthalidone (HYGROTEN) 25 mg tablet Patients takes 1/2 tab q day    atorvastatin (LIPITOR) 10 mg tablet Take 1 Tab by mouth daily.  amLODIPine-benazepril (LOTREL) 10-40 mg per capsule TAKE 1 CAPSULE DAILY    zolpidem (AMBIEN) 10 mg tablet Take 1 Tab by mouth nightly as needed for Sleep. Max Daily Amount: 10 mg.    omeprazole (PRILOSEC) 10 mg capsule Take 10 mg by mouth daily.  aspirin delayed-release 81 mg tablet Take 1 Tab by mouth daily. No current facility-administered medications for this visit. Past Medical History:   Diagnosis Date    Asymptomatic gallstones 6/8/2017    GERD (gastroesophageal reflux disease)     Heart murmur     Hepatitis     type C    Hypertension     Mitral regurgitation     Multiple renal cysts 6/8/2017    Multiple right renal cysts Largest 8.3 cm on ultrasound June 2017       History reviewed. No pertinent surgical history. History   Smoking Status    Former Smoker    Quit date: 1/1/1986   Smokeless Tobacco    Never Used       Family History   Problem Relation Age of Onset    Hypertension Mother     Cancer Father     Hypertension Father          History of Present Illness:  Patient here for  ongoing medical follow-up        Patient with a 10 year history of hypertension. Compliant with his current regimen. We recently increased his carvedilol. Blood pressure in good control today.   Most recent basic metabolic profile normal.  EKG 2018 shows sinus rhythm with poor R-wave progression across the anterior leads but no acute changes. Ultrasound abdominal aorta 6/17 negative for aneurysm. He denies any chest pain pressure or dyspnea. He is is on baby aspirin daily    I did note a heart murmur previously. We did an echocardiogram.  This showed minimal mitral regurg. Also showed some impaired diastolic relaxation. Cardiology did note severe left atrial enlargement. Since the last time I saw patient I did refer him to cardiology. They did review his echocardiogram.  They did a stress test that was normal.  They did a Holter monitor which showed some PACs, PVCs and short runs of atrial fibrillation. They recommended and wrote for Eliquis for the patient. He never started this medication. He is concerned about the side effects of bleeding since he has had a history of nosebleeds in the past.  We spent a great deal of time at previous visit talking about the risks of atrial fibrillation and treatment options including Coumadin or Eliquis. Patient is aware but at the moment wants to stay with his aspirin daily. He is aware this does not decrease the risk of stroke from atrial fibrillation. I have asked him to reconsider and he will let me know if he changes his mind    Because of his overall cardiac risk he is now on Lipitor therapy. Most recent lipid panel excellent with normal LFTs. 7/18       Patient has a history of hepatitis C. Diagnosed about 10 years ago. He did undergo liver biopsy in's and some additional testing at that point. He states they did not feel further treatment was indicated then. He is aware that there are new treatments available now Blood work from June 2017 confirms ongoing hepatitis C. LFTs are normal.  I have recommended referral back to hepatology for consideration of treatment options. Patient is aware but he declines at present. Ultrasound right upper quadrant revealed normal liver.   He again declines any referral for additional treatment    Abdominal ultrasound also revealed gallstones. Gallbladder was not inflamed. He has no symptoms referable to his gallbladder. We will follow. He will notify me if he develops any symptoms. Patient was found to have renal cysts on the right. Largest 8.3 cm. Nonobstructing. Repeat ultrasound 7/18 showed no change from last year    Patient does have a history of GERD. He has had peptic ulcer disease in the past.  He has had an EGD in the past.  He continues on low-dose omeprazole. He states he gets symptoms if he tries to stop this medication. He has tried Zantac in the past but that does not control his symptoms. We did discuss risks of long-term PPI use. He wishes to continue his current regimen    Patient does take as needed Ambien. 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 Does not take a regular basis. He did not ask for refill today. Patient does have mild elevated BMI. He has been working on his diet and has lost some weight    Patient has a history of sun exposure and actinic keratosis which I frozen in the past.  I recently excised a basal cell from his right forearm. That is healed. I also treated some more actinic keratoses at that time. Based on the above I did refer him to dermatology for full skin evaluation. He has not made the appointment yet and I encouraged him to do so. He has no lesions of concern today    Patient does have BPH. No personal or family history of prostate cancer. He was up 2-3 times a night to urinate. He did have some hesitancy during the day. No incontinence. He previously was tried on Rapaflo but had some side effects from this. Prostate exam 6/17 revealed an enlarged prostate without nodules. PSA was 2.4 normal.  We did start patient on Hytrin both for blood pressure control and BPH. He states that is worked well. His urinary symptoms have improved. Repeat PSA 7/18 improved at 1.7.   He declines prostate exam this year    Past medical history is otherwise negative     patient had negative colonoscopy 2017. He was told he could go 10 years for his next colonoscopy. No family history of colon cancer    Patient had a tetanus booster within the last 10 years. He has had the Zostavax. Prevnar was given 12/17 flu shot recommended this fall      Review of Systems:    Review of systems negative except as noted above      Physical Exam:  Visit Vitals    /80 (BP 1 Location: Right arm, BP Patient Position: Sitting)    Pulse (!) 53    Temp 97 °F (36.1 °C) (Oral)    Resp 16    Ht 5' 9\" (1.753 m)    Wt 181 lb 9.6 oz (82.4 kg)    SpO2 95%    BMI 26.82 kg/m2     Vitals:    08/16/18 0720   BP: 138/80   BP 1 Location: Right arm   BP Patient Position: Sitting   Pulse: (!) 53   Resp: 16   Temp: 97 °F (36.1 °C)   TempSrc: Oral   SpO2: 95%   Weight: 181 lb 9.6 oz (82.4 kg)   Height: 5' 9\" (1.753 m)     Patient no acute distress vital signs repeated. Blood pressure is 130/85 bilaterally on my check. Patient gets systolic readings in the 816-648 range at home  Head is normocephalic  External ears normal.   Eyes PERRLA. EOMs full. Sclera clear patient does see his eye doctor regularly. We are getting those records    Patient follows with his dentist regularly  Neck no nodes no masses no bruits  Chest was clear no wheezes rhonchi or rales. Good air exchange  Cor regular rate and rhythm no S3-S4 no murmurs appreciated today. No ectopy  Abdomen  soft and was nontender  Extremities no edema. Nontender. Good range of motion  Gait and gross neurologic exam were normal  EKG 1/18 showed normal sinus rhythm with poor R-wave progression anterior leads      1. Essential hypertension  Blood pressure improved today and in acceptable control at home. He is can continue with his current medication. Recent lab work acceptable. Overall medically stable. I will see him back in 6 months sooner if needed.   Sooner if he changes his mind on treatment or referral for the above-noted issues    2. Basal cell carcinoma (BCC) of skin of right upper extremity including shoulder  I referred patient to dermatology    3. Paroxysmal atrial fibrillation [facility])  Patient declining anticoagulation therapy    4. BMI 27.0-27.9,adult  Working her diet has lost some weight    5. Benign prostatic hyperplasia without lower urinary tract symptoms  Symptoms improve with PSA lower this year      Patient Instructions   Continue current medications    Work on diet and exercise    Follow-up with dermatology    Keep planned follow up visit       Patient Instructions   Continue current medications    Work on diet and exercise    Follow-up with dermatology    Keep planned follow up visit           Continue current therapy plan except for indicated above. Verbal and written instructions (see AVS) provided.  Patient expresses understanding of diagnosis and treatment plan. Follow-up Disposition:  Return in about 6 months (around 2/16/2019). Paty Vaughn.  Swetha Ball MD

## 2018-08-16 NOTE — MR AVS SNAPSHOT
[facility] 
[phone] Patient: Valentin Brown MRN: [id number] :1950 Visit Information Date & Time Provider Department Dept. Phone Encounter #  
 2018  7:20 AM Neel Forbes MD [facility] [phone] [id number] Your Appointments 2019  7:00 AM  
Any with Neel Forbes MD  
[address] [place] [address] [facility]: 6 mo f/u $0 CP mbm  
 [address] [id number]  
[phone]  
  
   
 [address] [address] Upcoming Health Maintenance Date Due  
 GLAUCOMA SCREENING Q2Y 2017 Influenza Age 5 to Adult 2018 Pneumococcal 65+ Low/Medium Risk (2 of 2 - PPSV23) 1/3/2019 MEDICARE YEARLY EXAM 2019 DTaP/Tdap/Td series (2 - Td) 2027 COLONOSCOPY 2027 Allergies as of 2018  Review Complete On: 2018 By: Neel Forbes MD  
  
 Severity Noted Reaction Type Reactions Pcn [Penicillins]  2017    Rash Current Immunizations  Never Reviewed Name Date Influenza Vaccine PF 2017 12:00 AM  
 Pneumococcal Conjugate (PCV-13) 1/3/2018 Not reviewed this visit Vitals BP Pulse Temp Resp Height(growth percentile) Weight(growth percentile) 138/80 (BP 1 Location: Right arm, BP Patient Position: Sitting) (!) 53 97 °F (36.1 °C) (Oral) 16 5' 9\" (1.753 m) 181 lb 9.6 oz (82.4 kg) SpO2 BMI Smoking Status 95% 26.82 kg/m2 Former Smoker BMI and BSA Data Body Mass Index Body Surface Area  
 26.82 kg/m 2 2 m 2 Preferred Pharmacy Pharmacy Name Phone [facility] Randi Lukemariya Nelly Cranker [phone] Your Updated Medication List  
  
   
This list is accurate as of 18  8:00 AM.  Always use your most recent med list.  
  
  
  
  
 amLODIPine-benazepril 10-40 mg per capsule Commonly known as:  LOTREL  
TAKE 1 CAPSULE DAILY  
  
 aspirin delayed-release 81 mg tablet Take 1 Tab by mouth daily. atorvastatin 10 mg tablet Commonly known as:  LIPITOR Take 1 Tab by mouth daily. carvedilol 12.5 mg tablet Commonly known as:  COREG  
TAKE 2 AM  1 PM  
  
 chlorthalidone 25 mg tablet Commonly known as:  Reina Maxim Patients takes 1/2 tab q day PriLOSEC 10 mg capsule Generic drug:  omeprazole Take 10 mg by mouth daily. terazosin 2 mg capsule Commonly known as:  HYTRIN  
TAKE 1 CAPSULE NIGHTLY  
  
 zolpidem 10 mg tablet Commonly known as:  AMBIEN Take 1 Tab by mouth nightly as needed for Sleep. Max Daily Amount: 10 mg. Introducing [facility]! Kobe Barrios introduces [employer] patient portal. Now you can access parts of your medical record, email your doctor's office, and request medication refills online. 1. In your internet browser, go to [email or web address] 2. Click on the First Time User? Click Here link in the Sign In box. You will see the New Member Sign Up page. 3. Enter your [employer] Access Code exactly as it appears below. You will not need to use this code after youve completed the sign-up process. If you do not sign up before the expiration date, you must request a new code. · [employer] Access Code: [email or web address] Expires: 9/30/2018 10:03 AM 
 
4. Enter the last four digits of your Social Security Number (xxxx) and Date of Birth (mm/dd/yyyy) as indicated and click Submit. You will be taken to the next sign-up page. 5. Create a [employer] ID. This will be your [employer] login ID and cannot be changed, so think of one that is secure and easy to remember. 6. Create a [employer] password. You can change your password at any time. 7. Enter your Password Reset Question and Answer. This can be used at a later time if you forget your password. 8. Enter your e-mail address. You will receive e-mail notification when new information is available in [address] 9. Click Sign Up. You can now view and download portions of your medical record. 10. Click the Download Summary menu link to download a portable copy of your medical information. If you have questions, please visit the Frequently Asked Questions section of the [employer] website. Remember, [employer] is NOT to be used for urgent needs. For medical emergencies, dial 911. Now available from your iPhone and Android! Please provide this summary of care documentation to your next provider. Your primary care clinician is listed as Marcelo Hernandez. If you have any questions after today's visit, please call [phone].

## 2018-08-16 NOTE — PATIENT INSTRUCTIONS
Continue current medications    Work on diet and exercise    Follow-up with dermatology    Keep planned follow up visit

## 2018-08-16 NOTE — PROGRESS NOTES
Chief Complaint   Patient presents with    Hypertension     1 mo f/u med increase     Health Maintenance Due   Topic Date Due    GLAUCOMA SCREENING Q2Y  12/24/2017    Influenza Age 5 to Adult  08/01/2018     Visit Vitals    /80 (BP 1 Location: Right arm, BP Patient Position: Sitting)    Pulse (!) 53    Temp 97 °F (36.1 °C) (Oral)    Resp 16    Ht 5' 9\" (1.753 m)    Wt 181 lb 9.6 oz (82.4 kg)    SpO2 95%    BMI 26.82 kg/m2     1. Have you been to the ER, urgent care clinic since your last visit? Hospitalized since your last visit? No    2. Have you seen or consulted any other health care providers outside of the [address] [place] [address] since your last visit? Include any pap smears or colon screening.  No    Rylee Cummings LPN

## 2018-10-09 ENCOUNTER — DOCUMENTATION ONLY (OUTPATIENT)
Dept: FAMILY MEDICINE CLINIC | Age: 68
End: 2018-10-09

## 2019-05-13 ENCOUNTER — OFFICE VISIT (OUTPATIENT)
Dept: SURGERY | Age: 69
End: 2019-05-13

## 2019-05-13 VITALS
SYSTOLIC BLOOD PRESSURE: 156 MMHG | DIASTOLIC BLOOD PRESSURE: 87 MMHG | HEART RATE: 55 BPM | WEIGHT: 184.5 LBS | HEIGHT: 69 IN | BODY MASS INDEX: 27.33 KG/M2

## 2019-05-13 DIAGNOSIS — K21.9 GASTROESOPHAGEAL REFLUX DISEASE WITHOUT ESOPHAGITIS: Primary | ICD-10-CM

## 2019-05-13 NOTE — PROGRESS NOTES
Ami France is a 79 y.o. male who presents today with the following:  Chief Complaint   Patient presents with    GERD       HPI  53-year-old male who presents to us with history of reflux that is been present for at least 20 years. He has been on Prilosec 20 mg daily since then. Over the last 6 months he has been having breakthrough reflux. He states that he is tried some dietary changes such as eating earlier he also has attempted to raise the head of his bed but is still having occasional breakthrough episodes. When these occur he can feel a burning sensation all the way to his mouth. He has taken Tums which has some relief for him. He denies any unexpected weight loss and is actually gained 5 pounds over the last 6 months. He occasionally drinks 1-2 beers in the evenings. He has discontinued drinking sodas secondary to this exacerbating his symptoms. He used to smoke in the remote past but stopped 30 years ago. He believes 20 years ago he was treated for \"a bacterial infection of his stomach\" but he states that he did not have any testing for that. He is on a baby aspirin a day and does have a history of an irregular heart rhythm but he is not aware of it causing him any problems.     He also has a history of hepatitis C which has not been treated    Past Medical History:   Diagnosis Date    Asymptomatic gallstones 6/8/2017    GERD (gastroesophageal reflux disease)     Heart murmur     Hepatitis     type C    Hypertension     Mitral regurgitation     Multiple renal cysts 6/8/2017    Multiple right renal cysts Largest 8.3 cm on ultrasound June 2017       Past Surgical History:   Procedure Laterality Date    HX COLONOSCOPY  2016       Social History     Socioeconomic History    Marital status:      Spouse name: Not on file    Number of children: Not on file    Years of education: Not on file    Highest education level: Not on file   Occupational History    Not on file   Social Needs    Financial resource strain: Not on file    Food insecurity:     Worry: Not on file     Inability: Not on file    Transportation needs:     Medical: Not on file     Non-medical: Not on file   Tobacco Use    Smoking status: Former Smoker     Last attempt to quit: 1986     Years since quittin.3    Smokeless tobacco: Never Used   Substance and Sexual Activity    Alcohol use: Yes     Alcohol/week: 6.0 oz     Types: 10 Cans of beer per week     Comment: social    Drug use: No    Sexual activity: Yes     Partners: Female     Birth control/protection: None   Lifestyle    Physical activity:     Days per week: Not on file     Minutes per session: Not on file    Stress: Not on file   Relationships    Social connections:     Talks on phone: Not on file     Gets together: Not on file     Attends Islam service: Not on file     Active member of club or organization: Not on file     Attends meetings of clubs or organizations: Not on file     Relationship status: Not on file    Intimate partner violence:     Fear of current or ex partner: Not on file     Emotionally abused: Not on file     Physically abused: Not on file     Forced sexual activity: Not on file   Other Topics Concern    Not on file   Social History Narrative    Not on file       Family History   Problem Relation Age of Onset    Hypertension Mother     Cancer Father         lung    Hypertension Father        Allergies   Allergen Reactions    Pcn [Penicillins] Rash       Current Outpatient Medications   Medication Sig    traZODone (DESYREL) 50 mg tablet Take 1 Tab by mouth nightly.  Omeprazole delayed release (PRILOSEC D/R) 20 mg tablet Take 1 Tab by mouth daily.  terazosin (HYTRIN) 5 mg capsule TAKE 1 CAPSULE NIGHTLY    atorvastatin (LIPITOR) 10 mg tablet Take 1 Tab by mouth daily.     amLODIPine-benazepril (LOTREL) 10-40 mg per capsule TAKE 1 CAPSULE DAILY    chlorthalidone (HYGROTEN) 25 mg tablet Patients takes 1/2 tab q day    zolpidem (AMBIEN) 10 mg tablet TAKE 1 TABLET BY MOUTH EVERY NIGHT AS NEEDED FOR SLEEP    carvedilol (COREG) 12.5 mg tablet TAKE 2 AM  1 PM (Patient taking differently: TAKE 2 AM  1 PM  Indications: pt taking 1 in am, 2 in pm)    aspirin delayed-release 81 mg tablet Take 1 Tab by mouth daily. No current facility-administered medications for this visit. The above histories, medications and allergies have been reviewed. Review of Systems   Gastrointestinal: Positive for abdominal pain and heartburn. Visit Vitals  /87 (BP 1 Location: Left arm, BP Patient Position: Sitting)   Pulse (!) 55   Ht 5' 9\" (1.753 m)   Wt 184 lb 8 oz (83.7 kg)   BMI 27.25 kg/m²     Physical Exam   Constitutional: He is well-developed, well-nourished, and in no distress. HENT:   Head: Normocephalic and atraumatic. Cardiovascular: Normal rate and regular rhythm. Pulmonary/Chest: Effort normal and breath sounds normal. No respiratory distress. Abdominal: Soft. Bowel sounds are normal. He exhibits no distension and no mass. There is no tenderness. There is no rebound and no guarding. Umbilical hernia that is easily reducible       1. Gastroesophageal reflux disease without esophagitis  Recommend he undergo EGD. Risks of the procedure were shared with him including the risks of aspiration, esophageal or gastric injury or missed lesions. He voiced understanding and wishes to proceed. We will schedule. Follow-up and Dispositions    · Return in about 2 weeks (around 2019) for post procedure.          Sean Kelly MD

## 2019-05-13 NOTE — PATIENT INSTRUCTIONS
Upper GI Endoscopy: Before Your Procedure  What is an upper GI endoscopy? An upper gastrointestinal (or GI) endoscopy is a test that allows your doctor to look at the inside of your esophagus, stomach, and the first part of your small intestine, called the duodenum. The esophagus is the tube that carries food to your stomach. The doctor uses a thin, lighted tube that bends. It is called an endoscope, or scope. The doctor puts the tip of the scope in your mouth and gently moves it down your throat. The scope is a flexible video camera. The doctor looks at a monitor (like a TV set or a computer screen) as he or she moves the scope. A doctor may do this test, which is also called a procedure, to look for ulcers, tumors, infection, or bleeding. It also can be used to look for signs of acid backing up into your esophagus. This is called gastroesophageal reflux disease, or GERD. The doctor can use the scope to take a sample of tissue for study (a biopsy). The doctor also can use the scope to take out growths or stop bleeding. Follow-up care is a key part of your treatment and safety. Be sure to make and go to all appointments, and call your doctor if you are having problems. It's also a good idea to know your test results and keep a list of the medicines you take. What happens before the procedure?   Preparing for the procedure    · Understand exactly what procedure is planned, along with the risks, benefits, and other options. · Tell your doctors ALL the medicines, vitamins, supplements, and herbal remedies you take. Some of these can increase the risk of bleeding or interact with anesthesia.     · If you take blood thinners, such as warfarin (Coumadin), clopidogrel (Plavix), or aspirin, be sure to talk to your doctor. He or she will tell you if you should stop taking these medicines before your procedure.  Make sure that you understand exactly what your doctor wants you to do.     · Your doctor will tell you which medicines to take or stop before your procedure. You may need to stop taking certain medicines a week or more before the procedure. So talk to your doctor as soon as you can.     · If you have an advance directive, let your doctor know. It may include a living will and a durable power of  for health care. Bring a copy to the hospital. If you don't have one, you may want to prepare one. It lets your doctor and loved ones know your health care wishes. Doctors advise that everyone prepare these papers before any type of surgery or procedure. Procedures can be stressful. This information will help you understand what you can expect. And it will help you safely prepare for your procedure. What happens on the day of the procedure? · Follow the instructions exactly about when to stop eating and drinking. If you don't, your procedure may be canceled. If your doctor told you to take your medicines on the day of the procedure, take them with only a sip of water.     · Take a bath or shower before you come in for your procedure. Do not apply lotions, perfumes, deodorants, or nail polish.     · Take off all jewelry and piercings. And take out contact lenses, if you wear them.    At the hospital or surgery center   · Bring a picture ID.     · The test may take 15 to 30 minutes.     · The doctor may spray medicine on the back of your throat to numb it. You also will get medicine to prevent pain and to relax you.     · You will lie on your left side. The doctor will put the scope in your mouth and toward the back of your throat. The doctor will tell you when to swallow. This helps the scope move down your throat. You will be able to breathe normally. The doctor will move the scope down your esophagus into your stomach.  The doctor also may look at the duodenum.     · If your doctor wants to take a sample of tissue for a biopsy, he or she may use small surgical tools, which are put into the scope, to cut off some tissue. You will not feel a biopsy, if one is taken. The doctor also can use the tools to stop bleeding or to do other treatments, if needed.     · You will stay at the hospital or surgery center for 1 to 2 hours until the medicine you were given wears off. Going home   · Be sure you have someone to drive you home. Anesthesia and pain medicine make it unsafe for you to drive.     · You will be given more specific instructions about recovering from your procedure. They will cover things like diet, wound care, follow-up care, driving, and getting back to your normal routine. When should you call your doctor? · You have questions or concerns.     · You don't understand how to prepare for your procedure.     · You become ill before the procedure (such as fever, flu, or a cold).     · You need to reschedule or have changed your mind about having the procedure. Where can you learn more? Go to http://dana-torsten.info/. Enter P790 in the search box to learn more about \"Upper GI Endoscopy: Before Your Procedure. \"  Current as of: March 27, 2018  Content Version: 11.9  © 0744-8395 [employer]. Care instructions adapted under license by [employer] (which disclaims liability or warranty for this information). If you have questions about a medical condition or this instruction, always ask your healthcare professional. [address] any warranty or liability for your use of this information.

## 2019-06-03 ENCOUNTER — OFFICE VISIT (OUTPATIENT)
Dept: SURGERY | Age: 69
End: 2019-06-03

## 2019-06-03 VITALS
BODY MASS INDEX: 27.46 KG/M2 | TEMPERATURE: 98.3 F | SYSTOLIC BLOOD PRESSURE: 153 MMHG | RESPIRATION RATE: 18 BRPM | HEART RATE: 65 BPM | HEIGHT: 69 IN | OXYGEN SATURATION: 96 % | DIASTOLIC BLOOD PRESSURE: 97 MMHG | WEIGHT: 185.4 LBS

## 2019-06-03 DIAGNOSIS — K21.00 GASTROESOPHAGEAL REFLUX DISEASE WITH ESOPHAGITIS: Primary | ICD-10-CM

## 2019-06-03 RX ORDER — PANTOPRAZOLE SODIUM 40 MG/1
40 TABLET, DELAYED RELEASE ORAL DAILY
Qty: 30 TAB | Refills: 2 | Status: SHIPPED | OUTPATIENT
Start: 2019-06-03 | End: 2020-01-15 | Stop reason: ALTCHOICE

## 2019-06-03 NOTE — PROGRESS NOTES
1. Have you been to the ER, urgent care clinic since your last visit? Hospitalized since your last visit? No    2. Have you seen or consulted any other health care providers outside of the [address] [place] [address] since your last visit? Include any pap smears or colon screening.  No

## 2019-06-03 NOTE — LETTER
6/3/2019 9:18 AM 
 
Patient:  Easton Pool YOB: 1950 Date of Visit: 6/3/2019 Dear Иван Owusu MD 
[address] [facility] [address] VIA In Basket 
 : Thank you for referring Mr. Easton Pool to me for evaluation/treatment. Below are the relevant portions of my assessment and plan of care. He underwent EGD on May 28 with findings of distal esophageal esophagitis. I recommended that he discontinue his Prilosec and be started on Protonix 40 mg daily. Hopefully this will help with his symptoms. If this fails to improve his symptoms we would be more than happy to see him back again. If you have questions, please do not hesitate to call me. I look forward to following Mr. Rodriguez along with you.  
 
 
 
Sincerely, 
 
 
Terrell Mcmahon MD

## 2019-06-03 NOTE — PATIENT INSTRUCTIONS

## 2019-06-03 NOTE — PROGRESS NOTES
[address] [facility]      Clinic Note - Follow up    Subjective     Madeline Rowe returns for scheduled follow up today. He is status post EGD that was performed back on May 28. He has been doing well. He has had at least one episode of breakthrough reflux. He states it was after he had some spicy pizza by a neighbor. Findings of a small hiatal hernia and EG junction esophagitis without metaplasia or dysplasia were shared with him. Objective     Visit Vitals  BP (!) 153/97 (BP 1 Location: Left arm, BP Patient Position: Sitting)   Pulse 65   Temp 98.3 °F (36.8 °C) (Oral)   Resp 18   Ht 5' 9\" (1.753 m)   Wt 185 lb 6.4 oz (84.1 kg)   SpO2 96%   BMI 27.38 kg/m²         PE  GEN - Awake, alert, communicating appropriately. NAD    Labs       FINAL PATHOLOGIC DIAGNOSIS   1. Gastric antrum, biopsy:   Benign gastric antral type mucosa, no pathologic diagnosis. 2. Gastroesophageal junction, biopsy:   Squamous and gastric junctional mucosa with reactive changes suggestive of reflux. Negative for specialized metaplastic epithelium and dysplasia. Assessment     Madeline Rowe is a 79 y. o.yr old male status post EGD with findings of distal esophageal esophagitis secondary to reflux. Plan   Will discontinue his over-the-counter Prilosec and increase him to 40 mg of Protonix daily. I have also discussed nonpharmacologic ways to decrease his episodes of reflux. Follow-up PRN.       Ananth Sifuentes MD

## 2020-07-24 ENCOUNTER — OFFICE VISIT (OUTPATIENT)
Dept: HEMATOLOGY | Age: 70
End: 2020-07-24

## 2020-07-24 VITALS
WEIGHT: 184 LBS | BODY MASS INDEX: 27.89 KG/M2 | OXYGEN SATURATION: 98 % | TEMPERATURE: 98 F | RESPIRATION RATE: 18 BRPM | HEART RATE: 77 BPM | HEIGHT: 68 IN | DIASTOLIC BLOOD PRESSURE: 92 MMHG | SYSTOLIC BLOOD PRESSURE: 165 MMHG

## 2020-07-24 DIAGNOSIS — Z11.59 ENCOUNTER FOR SCREENING FOR OTHER VIRAL DISEASES: ICD-10-CM

## 2020-07-24 DIAGNOSIS — B18.2 HEP C W/O COMA, CHRONIC (HCC): Primary | ICD-10-CM

## 2020-07-24 PROBLEM — L57.0 AK (ACTINIC KERATOSIS): Status: RESOLVED | Noted: 2017-05-31 | Resolved: 2020-07-24

## 2020-07-24 PROBLEM — K21.9 GASTROESOPHAGEAL REFLUX DISEASE WITHOUT ESOPHAGITIS: Status: RESOLVED | Noted: 2017-06-28 | Resolved: 2020-07-24

## 2020-07-24 PROBLEM — E78.5 DYSLIPIDEMIA: Status: ACTIVE | Noted: 2020-07-24

## 2020-07-24 PROBLEM — K75.9 HEPATITIS: Status: RESOLVED | Noted: 2017-05-31 | Resolved: 2020-07-24

## 2020-07-24 PROBLEM — N40.0 BENIGN PROSTATIC HYPERPLASIA WITHOUT LOWER URINARY TRACT SYMPTOMS: Status: RESOLVED | Noted: 2018-08-16 | Resolved: 2020-07-24

## 2020-07-24 PROBLEM — K80.20 ASYMPTOMATIC GALLSTONES: Status: RESOLVED | Noted: 2017-06-08 | Resolved: 2020-07-24

## 2020-07-24 PROBLEM — D23.4 BENIGN NEOPLASM OF SKIN OF NECK: Status: RESOLVED | Noted: 2017-05-31 | Resolved: 2020-07-24

## 2020-07-24 PROBLEM — I10 HTN (HYPERTENSION): Status: RESOLVED | Noted: 2017-05-31 | Resolved: 2020-07-24

## 2020-07-24 NOTE — PROGRESS NOTES
[address] [place] [address], MD, MD Stephen Witt PA-C Reymundo Furlong, ACN-BC     April S Sandra, [facility]   HONG Tello-SADAF Calderón, [facility]       Adele Correa [facility] [place] 136    at [address], [address] [place] [address], [facility] 22.    [phone]    FAX: [address] [place] [address], [address], [address]    [phone]    FAX: [phone]           Patient Care Team:  Samantha Ziegler MD as PCP - General (Family Medicine)  Samantha Ziegler MD as PCP - [facility] Provider      Problem List  Date Reviewed: 6/3/2019          Codes Class Noted    Dyslipidemia ICD-10-CM: E78.5  ICD-9-CM: 272.4  7/24/2020        Gastroesophageal reflux disease with esophagitis ICD-10-CM: K21.0  ICD-9-CM: 530.11  6/3/2019        BMI 27.0-27.9,adult ICD-10-CM: I05.08  ICD-9-CM: V85.23  7/2/2018        PAF (paroxysmal atrial fibrillation) ([facility] ICD-10-CM: I48.0  ICD-9-CM: 427.31  2/12/2018        Basal cell carcinoma ICD-10-CM: C44.91  ICD-9-CM: 173.91  7/5/2017        TAMERA (left atrial hypertrophy) ICD-10-CM: I51.7  ICD-9-CM: 429.3  6/28/2017        Calculus of gallbladder without cholecystitis without obstruction ICD-10-CM: K80.20  ICD-9-CM: 574.20  6/28/2017        Renal cyst ICD-10-CM: N28.1  ICD-9-CM: 753.10  6/28/2017        Multiple renal cysts ICD-10-CM: Q61.02  ICD-9-CM: 753.19  6/8/2017    Overview Signed 6/8/2017  6:56 PM by Shanae Boland     Multiple right renal cysts  Largest 8.3 cm on ultrasound June 2017             Essential hypertension ICD-10-CM: I10  ICD-9-CM: 401.9  5/31/2017        Hep C w/o coma, chronic ([facility] ICD-10-CM: B18.2  ICD-9-CM: 070.54  5/31/2017        Murmur, heart ICD-10-CM: R01.1  ICD-9-CM: 785.2  5/31/2017        Insomnia ICD-10-CM: G47.00  ICD-9-CM: 780.52  5/31/2017        Benign non-nodular prostatic hyperplasia with lower urinary tract symptoms ICD-10-CM: N40.1  ICD-9-CM: 600.91  5/31/2017              The clinicians listed above have asked me to see Kayli Lazcano in consultation regarding chronic HCV and its management. All medical records sent by the referring physicians were reviewed including imaging studies     The patient is a 81 y.o.  male who was screened for anti-HCV and tested positive after wife was diagnosed in 2000. Risk factors for acquiring HCV are not apparent     here was no history of acute icteric hepatitis at the time of these risk factors. Ultrasound of the liver was performed in 7/2020. The results of the imaging demonstrated a normal appearing liver. An assessment of liver fibrosis with biopsy or elastography has not been performed. The patient has never received treatment for chronic HCV. The patient has no symptoms which can be attributed to the liver disorder. The patient is not currently experiencing the following symptoms of liver disease:  fatigue, pain in the right side over the liver, yellowing of the eyes or skin, or swelling of the abdomen,     The patient completes all daily activities without any functional limitations. ASSESSMENT AND PLAN:  Chronic HCV   Chronic HCV with fibrosis of unclear severity. Liver transaminases are elevated. ALP is normal.  Liver function is normal.  The platelet count is normal.      Will perform laboratory testing to monitor liver function and degree of liver injury. This included BMP, hepatic panel, CBC with platelet count,   INR. Will perform and/or review results of HCV viral load, HCV genotype to define the specific treatment and duration of treatment that will be required.       Will perform serologic and virologic studies tto assess for other causes of chronic liver disease. The degree of fibrosis will be assessed by liver biopsy, Fibroscan or sheer wave elastography. Chronic HCV Treatment  The patient has not been treated for HCV. The patient has HCV genotype 1A. Discussed the treatment alternatives. The SVR/cure rate for HCV now exceeds 97% without significant side effects for most patients with HCV. The specific treatment is dependent upon genotype, viral load and histology. The patient should be treated with Harvoni (sofasbuvir and ledipasvir), Mavyret (glecaprevir and piprentasvir), OR Epclusa (sofosbuvir and valpitasvir) for 8-12 weeks. Screening for Hepatocellular Carcinoma  HCC screening is not necessary if the patient has no evidence of cirrhosis. Treatment of other medical problems in patients with chronic liver disease  There are no contraindications for the patient to take most medications that are necessary for treatment of other medical issues. The patient can take: Any medications utilized for treatment of DM. Statins to treat hypercholesterolemia    The patient does not comsume alcohol on a daily basis. Normal doses of acetaminophen, as recommended on the label of the bottle, are not hepatotoxic except in the setting of daily alcohol use, even in patients with cirrhosis and can be utilized for pain. Counseling for alcohol in patients with chronic liver disease  The patient was counseled regarding alcohol consumption and the effect of alcohol on chronic liver disease. The patient does not consume any significant amount of alcohol. Vaccinations   The need for vaccination against viral hepatitis A and B will be assessed with serologic and instituted as appropriate. Routine vaccinations against other bacterial and viral agents can be performed as indicated. Annual flu vaccination should be administered if indicated.       ALLERGIES  Allergies   Allergen Reactions    Pcn [Penicillins] Rash       MEDICATIONS  Current Outpatient Medications   Medication Sig    zolpidem (AMBIEN) 10 mg tablet Take 1 Tab by mouth nightly as needed for Sleep. Max Daily Amount: 10 mg. Indications: difficulty falling asleep    amLODIPine-benazepril (LOTREL) 10-40 mg per capsule TAKE 1 CAPSULE DAILY    atorvastatin (LIPITOR) 10 mg tablet Take 1 Tab by mouth daily.  terazosin (HYTRIN) 5 mg capsule TAKE 1 CAPSULE BY MOUTH ONCE DAILY AT NIGHT    carvediloL (COREG) 25 mg tablet TAKE 1 TABLET BY MOUTH TWICE DAILY WITH MEALS    omeprazole (PRILOSEC OTC) 20 mg tablet Take 20 mg by mouth daily.  chlorthalidone (HYGROTEN) 25 mg tablet Patients takes 1/2 tab q day    aspirin delayed-release 81 mg tablet Take 1 Tab by mouth daily. No current facility-administered medications for this visit. SYSTEM REVIEW NOT RELATED TO LIVER DISEASE OR REVIEWED ABOVE:  Constitution systems: Negative for fever, chills, weight gain, weight loss. Eyes: Negative for visual changes. ENT: Negative for sore throat, painful swallowing. Respiratory: Negative for cough, hemoptysis, SOB. Cardiology: Negative for chest pain, palpitations. GI:  Negative for constipation or diarrhea. : Negative for urinary frequency, dysuria, hematuria, nocturia. Skin: Negative for rash. Hematology: Negative for easy bruising, blood clots. Musculo-skelatal: Negative for back pain, muscle pain, weakness. Neurologic: Negative for headaches, dizziness, vertigo, memory problems not related to HE. Psychology: Negative for anxiety, depression. FAMILY HISTORY:  The father  of lung cancer. The mother  of natural causes. There is no family history of liver disease. There is no family history of immune disorders. SOCIAL HISTORY:  The patient is . The spouse was treated and cured of hepatitis C in . The patient has 3 children. The patient stopped using tobacco products in . The patient consumes 2-3 alcoholic beverages per day. The patient retired in 2017. Previous employment [employer]. PHYSICAL EXAMINATION:  Visit Vitals  BP (!) 165/92   Pulse 77   Temp 98 °F (36.7 °C) (Temporal)   Resp 18   Ht 5' 8\" (1.727 m)   Wt 184 lb (83.5 kg)   SpO2 98%   BMI 27.98 kg/m²     General: No acute distress. Eyes: Sclera anicteric. ENT: No oral lesions. Thyroid normal.  Nodes: No adenopathy. Skin: No spider angiomata. No jaundice. No palmar erythema. Respiratory: Lungs clear to auscultation. Cardiovascular: Regular heart rate. No murmurs. No JVD. Abdomen: Soft non-tender. Liver size normal to percussion/palpation. Spleen not palpable. No obvious ascites. Extremities: No edema. No muscle wasting. No gross arthritic changes. Neurologic: Alert and oriented. Cranial nerves grossly intact. No asterixis. LABORATORY STUDIES:  Liver [place] of [address] Units 6/19/2020   WBC 3.4 - 10.8 x10E3/uL 4.0   ANC 1.4 - 7.0 x10E3/uL 1.9   HGB 13.0 - 17.7 g/dL 15.3    - 450 x10E3/uL 159   AST 0 - 40 IU/L 45 (H)   ALT 0 - 44 IU/L 50 (H)   Alk Phos 39 - 117 IU/L 56   Bili, Total 0.0 - 1.2 mg/dL 1.2   Albumin 3.8 - 4.8 g/dL 4.8   BUN 8 - 27 mg/dL 17   Creat 0.76 - 1.27 mg/dL 0.95   Na 134 - 144 mmol/L 137   K mmol/L CANCELED   Cl 96 - 106 mmol/L 101   CO2 20 - 29 mmol/L 21   Glucose mg/dL CANCELED       SEROLOGIES:  Serologies Latest Ref Rng & Units 7/24/2020 6/19/2020   Hep A Ab, Total Negative Positive (A)    Hep B Surface Ag Negative Negative    Hep B Core Ab, Total Negative Negative    Hep B Surface AB QL  Non Reactive    Hep C Genotype   1a   HCV RT-PCR, Quant IU/mL  3,090,000   HCV Log10 log10 IU/mL  6.490   Ferritin 30 - 400 ng/mL 66    Iron % Saturation 15 - 55 % 15      Not available or performed. Testing was performed today. LIVER HISTOLOGY:  Not available or performed    ENDOSCOPIC PROCEDURES:  Not available or performed    RADIOLOGY:  7/2020. Ultrasound of liver.  Increased echogenicity consistent with steatosis. No ductal dilatation. Splenomegaly. Right renal cyst 7.3 cm x 7.2 cm x 7.6 cm in size in the upper pole. The left renal cyst measures 4.5 cm x 4.2 cm x 3.2 cm in size in the upper pole. OTHER TESTING:  Not available or performed    FOLLOW-UP:  All of the issues listed above in the Assessment and Plan were discussed with the patient. All questions were answered. The patient expressed a clear understanding of the above. [address] [address] in 3 weeks for Fibroscan and to initiate HCV treatment. April KING Flores, [facility] of [address] [address] [facility], [address]  22.  201 [facility]

## 2020-07-24 NOTE — PROGRESS NOTES
Chief Complaint   Patient presents with    New Patient         1. Have you been to the ER, urgent care clinic since your last visit? Hospitalized since your last visit? no    2. Have you seen or consulted any other health care providers outside of the [address] [place] [address] since your last visit? Include any pap smears or colon screening.   no

## 2020-07-25 LAB
FERRITIN SERPL-MCNC: 66 NG/ML (ref 30–400)
HAV AB SER QL IA: POSITIVE
HBV CORE AB SERPL QL IA: NEGATIVE
HBV SURFACE AB SER QL: NON REACTIVE
HBV SURFACE AG SERPL QL IA: NEGATIVE
IRON SATN MFR SERPL: 15 % (ref 15–55)
IRON SERPL-MCNC: 65 UG/DL (ref 38–169)
TIBC SERPL-MCNC: 444 UG/DL (ref 250–450)
UIBC SERPL-MCNC: 379 UG/DL (ref 111–343)

## 2020-07-31 ENCOUNTER — TELEPHONE (OUTPATIENT)
Dept: HEMATOLOGY | Age: 70
End: 2020-07-31

## 2020-08-12 ENCOUNTER — OFFICE VISIT (OUTPATIENT)
Dept: HEMATOLOGY | Age: 70
End: 2020-08-12
Payer: MEDICARE

## 2020-08-12 VITALS
BODY MASS INDEX: 27.7 KG/M2 | WEIGHT: 187 LBS | OXYGEN SATURATION: 97 % | RESPIRATION RATE: 16 BRPM | DIASTOLIC BLOOD PRESSURE: 85 MMHG | SYSTOLIC BLOOD PRESSURE: 159 MMHG | TEMPERATURE: 97 F | HEART RATE: 59 BPM | HEIGHT: 69 IN

## 2020-08-12 DIAGNOSIS — B18.2 HEP C W/O COMA, CHRONIC (HCC): Primary | ICD-10-CM

## 2020-08-12 PROCEDURE — 99214 OFFICE O/P EST MOD 30 MIN: CPT | Performed by: NURSE PRACTITIONER

## 2020-08-12 PROCEDURE — 91200 LIVER ELASTOGRAPHY: CPT | Performed by: NURSE PRACTITIONER

## 2020-08-12 NOTE — PROGRESS NOTES
Chief Complaint   Patient presents with    Follow-up         1. Have you been to the ER, urgent care clinic since your last visit? Hospitalized since your last visit? no    2. Have you seen or consulted any other health care providers outside of the [address] [place] [address] since your last visit? Include any pap smears or colon screening.   no

## 2020-08-14 NOTE — PROGRESS NOTES
[facility], MD, MD Sabas Estevez, PAOMARI Casey, ACNP-BC     April S Sandra, AGPCNP-BC   James Tang, FNP-SADAF Rascon, [facility]-BC       Adele Garzon [place] 136    at [facility]    [address] [facility], [address] [facility], [facility] 22.    [phone]    FAX: [address] [place] [address] Avenue    [facility]    [address], [address] [address]    [facility], [address]    [phone]    FAX: [phone]           Patient Care Team:  Luisa Morse MD as PCP - General (Family Medicine)      Problem List  Date Reviewed: 7/25/2020          Codes Class Noted    Dyslipidemia ICD-10-CM: E78.5  ICD-9-CM: 272.4  7/24/2020        Gastroesophageal reflux disease with esophagitis ICD-10-CM: K21.0  ICD-9-CM: 530.11  6/3/2019        BMI 27.0-27.9,adult ICD-10-CM: Y99.19  ICD-9-CM: V85.23  7/2/2018        PAF (paroxysmal atrial fibrillation) ([facility] ICD-10-CM: I48.0  ICD-9-CM: 427.31  2/12/2018        Basal cell carcinoma ICD-10-CM: C44.91  ICD-9-CM: 173.91  7/5/2017        TAMERA (left atrial hypertrophy) ICD-10-CM: I51.7  ICD-9-CM: 429.3  6/28/2017        Calculus of gallbladder without cholecystitis without obstruction ICD-10-CM: K80.20  ICD-9-CM: 574.20  6/28/2017        Renal cyst ICD-10-CM: N28.1  ICD-9-CM: 753.10  6/28/2017        Multiple renal cysts ICD-10-CM: Q61.02  ICD-9-CM: 753.19  6/8/2017    Overview Signed 6/8/2017  6:56 PM by Shasta Shields     Multiple right renal cysts  Largest 8.3 cm on ultrasound June 2017             Essential hypertension ICD-10-CM: I10  ICD-9-CM: 401.9  5/31/2017        Hep C w/o coma, chronic ([facility] ICD-10-CM: B18.2  ICD-9-CM: 070.54  5/31/2017        Murmur, heart ICD-10-CM: R01.1  ICD-9-CM: 785.2  5/31/2017        Insomnia ICD-10-CM: G47.00  ICD-9-CM: 780.52  5/31/2017        Benign non-nodular prostatic hyperplasia with lower urinary tract symptoms ICD-10-CM: N40.1  ICD-9-CM: 600.91  5/31/2017              The clinicians listed above have asked me to see Blanka García in consultation regarding chronic HCV and its management. All medical records sent by the referring physicians were reviewed including imaging studies     The patient is a 81 y.o.  male who was screened for anti-HCV and tested positive after wife was diagnosed in 2000. Risk factors for acquiring HCV are not apparent     There was no history of acute icteric hepatitis at the time of these risk factors. Ultrasound of the liver was performed in 7/2020. The results of the imaging demonstrated a normal appearing liver. Assessment of liver fibrosis with Fibroscan was performed in the office today. The result was 7.0 kPa which correlates with stage 1 portal fibrosis to stage 2 septal fibrosis. The CAP score of 340 suggests fatty liver. The patient has never received treatment for chronic HCV. The patient has no symptoms which can be attributed to the liver disorder. The patient is not currently experiencing the following symptoms of liver disease:  fatigue, pain in the right side over the liver, yellowing of the eyes or skin, or swelling of the abdomen,     The patient completes all daily activities without any functional limitations. ASSESSMENT AND PLAN:  Chronic HCV   Chronic HCV with stage 1 portal fibrosis to stage 2 septal fibrosis. Severity of the liver disease was assessed by laboratory studies, Imaging, and elastography in 8/2020. Vladislav Ace Liver transaminases are elevated. ALP is normal.  Liver function is normal.  The platelet count is low normal.      Based upon laboratory studies, Fibroscan, and imaging  the patient does not appear to have significant liver injury.     Have performed laboratory testing to monitor liver function and degree of liver injury. This included BMP, hepatic panel, and CBC with platelet count. Chronic HCV Treatment  The patient has not been treated for HCV. The patient has HCV genotype 1A. The patient should be treated with Epclusa (sofosbuvir and valpitasvir) for 12 weeks. Medication teaching including side effects and interactions discussed with the patient. He should hold atorvastatin while on treatment and take omeprazole 4 hours after dose. The SVR/cure rate for is over 95%. Screening for Hepatocellular Carcinoma  HCC screening is not necessary if the patient has no evidence of cirrhosis. Treatment of other medical problems in patients with chronic liver disease  There are no contraindications for the patient to take most medications that are necessary for treatment of other medical issues. The patient can take: Any medications utilized for treatment of DM. Statins to treat hypercholesterolemia    The patient does not comsume alcohol on a daily basis. Normal doses of acetaminophen, as recommended on the label of the bottle, are not hepatotoxic except in the setting of daily alcohol use, even in patients with cirrhosis and can be utilized for pain. Counseling for alcohol in patients with chronic liver disease  The patient was counseled regarding alcohol consumption and the effect of alcohol on chronic liver disease. The patient does not consume any significant amount of alcohol. Vaccinations   The need for vaccination against viral hepatitis A and B will be assessed with serologic and instituted as appropriate. Routine vaccinations against other bacterial and viral agents can be performed as indicated. Annual flu vaccination should be administered if indicated. ALLERGIES  Allergies   Allergen Reactions    Pcn [Penicillins] Rash       MEDICATIONS  Current Outpatient Medications   Medication Sig    zolpidem (AMBIEN) 10 mg tablet Take 1 Tab by mouth nightly as needed for Sleep.  Max Daily Amount: 10 mg. Indications: difficulty falling asleep    amLODIPine-benazepril (LOTREL) 10-40 mg per capsule TAKE 1 CAPSULE DAILY    atorvastatin (LIPITOR) 10 mg tablet Take 1 Tab by mouth daily.  terazosin (HYTRIN) 5 mg capsule TAKE 1 CAPSULE BY MOUTH ONCE DAILY AT NIGHT    carvediloL (COREG) 25 mg tablet TAKE 1 TABLET BY MOUTH TWICE DAILY WITH MEALS    omeprazole (PRILOSEC OTC) 20 mg tablet Take 20 mg by mouth daily.  chlorthalidone (HYGROTEN) 25 mg tablet Patients takes 1/2 tab q day    aspirin delayed-release 81 mg tablet Take 1 Tab by mouth daily. No current facility-administered medications for this visit. SYSTEM REVIEW NOT RELATED TO LIVER DISEASE OR REVIEWED ABOVE:  Constitution systems: Negative for fever, chills, weight gain, weight loss. Eyes: Negative for visual changes. ENT: Negative for sore throat, painful swallowing. Respiratory: Negative for cough, hemoptysis, SOB. Cardiology: Negative for chest pain, palpitations. GI:  Negative for constipation or diarrhea. : Negative for urinary frequency, dysuria, hematuria, nocturia. Skin: Negative for rash. Hematology: Negative for easy bruising, blood clots. Musculo-skelatal: Negative for back pain, muscle pain, weakness. Neurologic: Negative for headaches, dizziness, vertigo, memory problems not related to HE. Psychology: Negative for anxiety, depression. FAMILY HISTORY:  The father  of lung cancer. The mother  of natural causes. There is no family history of liver disease. There is no family history of immune disorders. SOCIAL HISTORY:  The patient is . The spouse was treated and cured of hepatitis C in . The patient has 3 children. The patient stopped using tobacco products in . The patient consumes 2-3 alcoholic beverages per day. The patient retired in 2017. Previous employment [employer].      PHYSICAL EXAMINATION:  Visit Vitals  /85 Pulse (!) 59   Temp 97 °F (36.1 °C) (Temporal)   Resp 16   Ht 5' 9\" (1.753 m)   Wt 187 lb (84.8 kg)   SpO2 97%   BMI 27.62 kg/m²     General: No acute distress. Eyes: Sclera anicteric. ENT: No oral lesions. Thyroid normal.  Nodes: No adenopathy. Skin: No spider angiomata. No jaundice. No palmar erythema. Respiratory: Lungs clear to auscultation. Cardiovascular: Regular heart rate. No murmurs. No JVD. Abdomen: Soft non-tender. Liver size normal to percussion/palpation. Spleen not palpable. No obvious ascites. Extremities: No edema. No muscle wasting. No gross arthritic changes. Neurologic: Alert and oriented. Cranial nerves grossly intact. No asterixis. LABORATORY STUDIES:  Liver [place] of [address] Units 6/19/2020   WBC 3.4 - 10.8 x10E3/uL 4.0   ANC 1.4 - 7.0 x10E3/uL 1.9   HGB 13.0 - 17.7 g/dL 15.3    - 450 x10E3/uL 159   AST 0 - 40 IU/L 45 (H)   ALT 0 - 44 IU/L 50 (H)   Alk Phos 39 - 117 IU/L 56   Bili, Total 0.0 - 1.2 mg/dL 1.2   Albumin 3.8 - 4.8 g/dL 4.8   BUN 8 - 27 mg/dL 17   Creat 0.76 - 1.27 mg/dL 0.95   Na 134 - 144 mmol/L 137   K mmol/L CANCELED   Cl 96 - 106 mmol/L 101   CO2 20 - 29 mmol/L 21   Glucose mg/dL CANCELED       SEROLOGIES:  Serologies Latest Ref Rng & Units 7/24/2020 6/19/2020   Hep A Ab, Total Negative Positive (A)    Hep B Surface Ag Negative Negative    Hep B Core Ab, Total Negative Negative    Hep B Surface AB QL  Non Reactive    Hep C Genotype   1a   HCV RT-PCR, Quant IU/mL  3,090,000   HCV Log10 log10 IU/mL  6.490   Ferritin 30 - 400 ng/mL 66    Iron % Saturation 15 - 55 % 15        LIVER HISTOLOGY:  8/2020. FibroScan performed at [address]. EkPa was 7.0. IQR/med 7%. . The CAP score suggests fatty liver. ENDOSCOPIC PROCEDURES:  Not available or performed    RADIOLOGY:  7/2020. Ultrasound of liver. Increased echogenicity consistent with steatosis. No ductal dilatation. Splenomegaly.  Right renal cyst 7.3 cm x 7.2 cm x 7.6 cm in size in the upper pole. The left renal cyst measures 4.5 cm x 4.2 cm x 3.2 cm in size in the upper pole. OTHER TESTING:  Not available or performed    FOLLOW-UP:  All of the issues listed above in the Assessment and Plan were discussed with the patient. All questions were answered. The patient expressed a clear understanding of the above. [address] [address] 4 weeks after starting hepatitis C medication. April SRINI [facility] of [address] [facility] [address] [address] Vaughn Meek, [address] [facility] 22.  201 [facility]

## 2020-09-02 DIAGNOSIS — B18.2 HEP C W/O COMA, CHRONIC (HCC): Primary | ICD-10-CM

## 2020-09-02 RX ORDER — VELPATASVIR AND SOFOSBUVIR 100; 400 MG/1; MG/1
1 TABLET, FILM COATED ORAL DAILY
Qty: 28 TAB | Refills: 2 | Status: SHIPPED | OUTPATIENT
Start: 2020-09-02 | End: 2021-03-02 | Stop reason: ALTCHOICE

## 2020-10-02 ENCOUNTER — TELEPHONE (OUTPATIENT)
Dept: HEMATOLOGY | Age: 70
End: 2020-10-02

## 2020-10-09 ENCOUNTER — OFFICE VISIT (OUTPATIENT)
Dept: HEMATOLOGY | Age: 70
End: 2020-10-09
Payer: MEDICARE

## 2020-10-09 VITALS
HEART RATE: 71 BPM | SYSTOLIC BLOOD PRESSURE: 159 MMHG | OXYGEN SATURATION: 97 % | TEMPERATURE: 98.2 F | WEIGHT: 188.2 LBS | BODY MASS INDEX: 27.88 KG/M2 | HEIGHT: 69 IN | RESPIRATION RATE: 18 BRPM | DIASTOLIC BLOOD PRESSURE: 94 MMHG

## 2020-10-09 DIAGNOSIS — B18.2 HEP C W/O COMA, CHRONIC (HCC): Primary | ICD-10-CM

## 2020-10-09 PROCEDURE — G0463 HOSPITAL OUTPT CLINIC VISIT: HCPCS | Performed by: NURSE PRACTITIONER

## 2020-10-09 PROCEDURE — 99213 OFFICE O/P EST LOW 20 MIN: CPT | Performed by: NURSE PRACTITIONER

## 2020-10-09 NOTE — PROGRESS NOTES
Identified pt with two pt identifiers(name and ). Reviewed record in preparation for visit and have obtained necessary documentation. Chief Complaint   Patient presents with    Follow-up     Follow Up with Sandra      Vitals:    10/09/20 1200   BP: (!) 159/94   Pulse: 71   Resp: 18   Temp: 98.2 °F (36.8 °C)   TempSrc: Temporal   SpO2: 97%   Weight: 188 lb 3.2 oz (85.4 kg)   Height: 5' 9\" (1.753 m)   PainSc:   0 - No pain     Pt. States BP is at a normal range for him    Health Maintenance Review: Patient reminded of \"due or due soon\" health maintenance. I have asked the patient to contact his/her primary care provider (PCP) for follow-up on his/her health maintenance. Coordination of Care Questionnaire:  :   1) Have you been to an emergency room, urgent care, or hospitalized since your last visit? If yes, where when, and reason for visit? no       2. Have seen or consulted any other health care provider since your last visit? If yes, where when, and reason for visit? NO      Patient is accompanied by wife I have received verbal consent from Cuba Glez to discuss any/all medical information while they are present in the room.

## 2020-10-09 NOTE — PROGRESS NOTES
[facility] hospitals, MD, MD Naomi Lucio, KENNA Wilkins [facility], ACNP-BC     April S Sandra, AGNP-BC   Jhon Pinto, FNP-C    Shirley uMsa, [facility]       Adele Correa [facility] 136    at [address], 81 [facility], [facility] 22.    [phone]    FAX: [address] [place] [address], [address], [address]    [phone]    FAX: [phone]         Patient Care Team:  Peyman Reza MD as PCP - General (Family Medicine)  Peyman Reza MD as PCP - [facility] Provider      Problem List  Date Reviewed: 8/14/2020          Codes Class Noted    Dyslipidemia ICD-10-CM: E78.5  ICD-9-CM: 272.4  7/24/2020        Gastroesophageal reflux disease with esophagitis ICD-10-CM: K21.00  ICD-9-CM: 530.11  6/3/2019        BMI 27.0-27.9,adult ICD-10-CM: E03.07  ICD-9-CM: V85.23  7/2/2018        PAF (paroxysmal [facility] fibrillation) ([facility] ICD-10-CM: I48.0  ICD-9-CM: 427.31  2/12/2018        Basal cell carcinoma ICD-10-CM: C44.91  ICD-9-CM: 173.91  7/5/2017        TAMERA (left atrial hypertrophy) ICD-10-CM: I51.7  ICD-9-CM: 429.3  6/28/2017        Calculus of gallbladder without cholecystitis without obstruction ICD-10-CM: K80.20  ICD-9-CM: 574.20  6/28/2017        Renal cyst ICD-10-CM: N28.1  ICD-9-CM: 753.10  6/28/2017        Multiple renal cysts ICD-10-CM: Q61.02  ICD-9-CM: 753.19  6/8/2017    Overview Signed 6/8/2017  6:56 PM by Omer Quinonez     Multiple right renal cysts  Largest 8.3 cm on ultrasound June 2017             Essential hypertension ICD-10-CM: I10  ICD-9-CM: 401.9  5/31/2017        Hep C w/o coma, chronic ([facility] ICD-10-CM: B18.2  ICD-9-CM: 070.54  5/31/2017        Murmur, heart ICD-10-CM: R01.1  ICD-9-CM: 785.2  5/31/2017        Insomnia ICD-10-CM: G47.00  ICD-9-CM: 780.52  5/31/2017        Benign non-nodular prostatic hyperplasia with lower urinary tract symptoms ICD-10-CM: N40.1  ICD-9-CM: 600.91  5/31/2017              Debbie Blanco is being seen at [address] for management of chronic HCV. The active problem list, all pertinent past medical history, medications, radiologic findings and laboratory findings related to the liver disorder were reviewed with the patient. The patient is a 81 y.o.  male who was screened for anti-HCV and tested positive after wife was diagnosed in 2000. Risk factors for acquiring HCV are not apparent     There was no history of acute icteric hepatitis at the time of these risk factors. Ultrasound of the liver was performed in 7/2020. The results of the imaging demonstrated a normal appearing liver. Assessment of liver fibrosis was performed with Fibroscan 8/2020. The result was 7.0 kPa which correlates with stage 1 portal fibrosis to stage 2 septal fibrosis. The CAP score of 340 suggests fatty liver. The patient started an intended 12 weeks of hepatitis C treatment with Epclusa 9/04/2020. The only noticeable side effect has been fatigue. He returns for testing to evaluate response to treatment. The patient has no symptoms which can be attributed to the liver disorder. The patient is not currently experiencing the following symptoms of liver disease:  fatigue, pain in the right side over the liver, yellowing of the eyes or skin, or swelling of the abdomen,     The patient completes all daily activities without any functional limitations. ASSESSMENT AND PLAN:  Chronic HCV   Chronic HCV with stage 1 portal fibrosis to stage 2 septal fibrosis. Severity of the liver disease was assessed by laboratory studies, Imaging, and elastography in 8/2020. Brigitte Boop Liver transaminases are elevated.   ALP is normal.  Liver function is normal.  The platelet count is low normal.      Based upon laboratory studies, Fibroscan, and imaging  the patient does not appear to have significant liver injury. Have performed laboratory testing to monitor liver function and degree of liver injury. This included BMP, hepatic panel, and CBC with platelet count. Chronic HCV Treatment  The patient has HCV genotype 1A. The patient started an intended 12 weeks of hepatitis C treatment with Epclusa (sofosbuvir and valpitasvir) 9/04/2020. A repeat HCV RNA will be ordered today to evaluate response to treatment. Screening for Hepatocellular Carcinoma  HCC screening is not necessary if the patient has no evidence of cirrhosis. Treatment of other medical problems in patients with chronic liver disease  There are no contraindications for the patient to take most medications that are necessary for treatment of other medical issues. The patient can take: Any medications utilized for treatment of DM. Statins to treat hypercholesterolemia    The patient does not comsume alcohol on a daily basis. Normal doses of acetaminophen, as recommended on the label of the bottle, are not hepatotoxic except in the setting of daily alcohol use, even in patients with cirrhosis and can be utilized for pain. Counseling for alcohol in patients with chronic liver disease  The patient was counseled regarding alcohol consumption and the effect of alcohol on chronic liver disease. The patient does not consume any significant amount of alcohol. Vaccinations   The need for vaccination against viral hepatitis A and B will be assessed with serologic and instituted as appropriate. Routine vaccinations against other bacterial and viral agents can be performed as indicated. Annual flu vaccination should be administered if indicated.       ALLERGIES  Allergies   Allergen Reactions    Pcn [Penicillins] Rash       MEDICATIONS  Current Outpatient Medications   Medication Sig    amLODIPine-benazepril (LOTREL) 10-40 mg per capsule Take 1 capsule by mouth once daily    terazosin (HYTRIN) 5 mg capsule TAKE 1 CAPSULE BY MOUTH ONCE DAILY AT NIGHT    carvediloL (COREG) 25 mg tablet TAKE 1 TABLET BY MOUTH TWICE DAILY WITH MEALS    sofosbuvir-velpatasvir (Epclusa) 400-100 mg tablet Take 1 Tab by mouth daily.  omeprazole (PRILOSEC OTC) 20 mg tablet Take 20 mg by mouth daily.  chlorthalidone (HYGROTEN) 25 mg tablet Patients takes 1/2 tab q day    aspirin delayed-release 81 mg tablet Take 1 Tab by mouth daily.  zolpidem (AMBIEN) 10 mg tablet Take 1 Tab by mouth nightly as needed for Sleep. Max Daily Amount: 10 mg. Indications: difficulty falling asleep    atorvastatin (LIPITOR) 10 mg tablet Take 1 Tab by mouth daily. No current facility-administered medications for this visit. SYSTEM REVIEW NOT RELATED TO LIVER DISEASE OR REVIEWED ABOVE:  Constitution systems: Negative for fever, chills, weight gain, weight loss. Eyes: Negative for visual changes. ENT: Negative for sore throat, painful swallowing. Respiratory: Negative for cough, hemoptysis, SOB. Cardiology: Negative for chest pain, palpitations. GI:  Negative for constipation or diarrhea. : Negative for urinary frequency, dysuria, hematuria, nocturia. Skin: Negative for rash. Hematology: Negative for easy bruising, blood clots. Musculo-skeletal: Negative for back pain, muscle pain, weakness. Neurologic: Negative for headaches, dizziness, vertigo, memory problems not related to HE. Psychology: Negative for anxiety, depression. FAMILY HISTORY:  The father  of lung cancer. The mother  of natural causes. There is no family history of liver disease. There is no family history of immune disorders. SOCIAL HISTORY:  The patient is . The spouse was treated and cured of hepatitis C in . The patient has 3 children. The patient stopped using tobacco products in . The patient consumes 2-3 alcoholic beverages per day. The patient retired in 2017. Previous employment [employer]. PHYSICAL EXAMINATION:  Visit Vitals  BP (!) 159/94 (BP 1 Location: Right arm, BP Patient Position: Sitting) Comment: Pt. States that this is normal   Pulse 71   Temp 98.2 °F (36.8 °C) (Temporal)   Resp 18   Ht 5' 9\" (1.753 m)   Wt 188 lb 3.2 oz (85.4 kg)   SpO2 97%   BMI 27.79 kg/m²     General: No acute distress. Eyes: Sclera anicteric. ENT: No oral lesions. Thyroid normal.  Nodes: No adenopathy. Skin: No spider angiomata. No jaundice. No palmar erythema. Respiratory: Lungs clear to auscultation. Cardiovascular: Regular heart rate. No murmurs. No JVD. Abdomen: Soft non-tender. Liver size normal to percussion/palpation. Spleen not palpable. No obvious ascites. Extremities: No edema. No muscle wasting. No gross arthritic changes. Neurologic: Alert and oriented. Cranial nerves grossly intact. No asterixis. LABORATORY STUDIES:  Liver [place] of [address] Units 6/19/2020   WBC 3.4 - 10.8 x10E3/uL 4.0   ANC 1.4 - 7.0 x10E3/uL 1.9   HGB 13.0 - 17.7 g/dL 15.3    - 450 x10E3/uL 159   AST 0 - 40 IU/L 45 (H)   ALT 0 - 44 IU/L 50 (H)   Alk Phos 39 - 117 IU/L 56   Bili, Total 0.0 - 1.2 mg/dL 1.2   Albumin 3.8 - 4.8 g/dL 4.8   BUN 8 - 27 mg/dL 17   Creat 0.76 - 1.27 mg/dL 0.95   Na 134 - 144 mmol/L 137   K mmol/L CANCELED   Cl 96 - 106 mmol/L 101   CO2 20 - 29 mmol/L 21   Glucose mg/dL CANCELED     Repeat testing done today. Will follow up when available.     SEROLOGIES:  Serologies Latest Ref Rng & Units 7/24/2020 6/19/2020   Hep A Ab, Total Negative Positive (A)    Hep B Surface Ag Negative Negative    Hep B Core Ab, Total Negative Negative    Hep B Surface AB QL  Non Reactive    Hep C Genotype   1a   HCV RT-PCR, Quant IU/mL  3,090,000   HCV Log10 log10 IU/mL  6.490   Ferritin 30 - 400 ng/mL 66    Iron % Saturation 15 - 55 % 15      Repeat testing done today. Will follow up when available. LIVER HISTOLOGY:  8/2020. [facility]. EkPa was 7.0. IQR/med 7%. . The CAP score suggests fatty liver. ENDOSCOPIC PROCEDURES:  Not available or performed    RADIOLOGY:  7/2020. Ultrasound of liver. Increased echogenicity consistent with steatosis. No ductal dilatation. Splenomegaly. Right renal cyst 7.3 cm x 7.2 cm x 7.6 cm in size in the upper pole. The left renal cyst measures 4.5 cm x 4.2 cm x 3.2 cm in size in the upper pole. OTHER TESTING:  Not available or performed    FOLLOW-UP:  All of the issues listed above in the Assessment and Plan were discussed with the patient. All questions were answered. The patient expressed a clear understanding of the above. [facility] 87 in 3 months to evaluate ongoing response to treatment. SRINI [facility] of [address] [address] [address], 2000 [facility] 22.  201 [facility]

## 2020-10-10 LAB
ALBUMIN SERPL-MCNC: 4.6 G/DL (ref 3.8–4.8)
ALP SERPL-CCNC: 65 IU/L (ref 39–117)
ALT SERPL-CCNC: 19 IU/L (ref 0–44)
AST SERPL-CCNC: 15 IU/L (ref 0–40)
BASOPHILS # BLD AUTO: 0 X10E3/UL (ref 0–0.2)
BASOPHILS NFR BLD AUTO: 1 %
BILIRUB DIRECT SERPL-MCNC: 0.2 MG/DL (ref 0–0.4)
BILIRUB SERPL-MCNC: 0.7 MG/DL (ref 0–1.2)
BUN SERPL-MCNC: 12 MG/DL (ref 8–27)
BUN/CREAT SERPL: 11 (ref 10–24)
CALCIUM SERPL-MCNC: 9.6 MG/DL (ref 8.6–10.2)
CHLORIDE SERPL-SCNC: 108 MMOL/L (ref 96–106)
CO2 SERPL-SCNC: 20 MMOL/L (ref 20–29)
CREAT SERPL-MCNC: 1.09 MG/DL (ref 0.76–1.27)
EOSINOPHIL # BLD AUTO: 0.2 X10E3/UL (ref 0–0.4)
EOSINOPHIL NFR BLD AUTO: 5 %
ERYTHROCYTE [DISTWIDTH] IN BLOOD BY AUTOMATED COUNT: 12.7 % (ref 11.6–15.4)
GLUCOSE SERPL-MCNC: 123 MG/DL (ref 65–99)
HCT VFR BLD AUTO: 43.2 % (ref 37.5–51)
HGB BLD-MCNC: 14.9 G/DL (ref 13–17.7)
IMM GRANULOCYTES # BLD AUTO: 0 X10E3/UL (ref 0–0.1)
IMM GRANULOCYTES NFR BLD AUTO: 0 %
LYMPHOCYTES # BLD AUTO: 1.3 X10E3/UL (ref 0.7–3.1)
LYMPHOCYTES NFR BLD AUTO: 30 %
MCH RBC QN AUTO: 29 PG (ref 26.6–33)
MCHC RBC AUTO-ENTMCNC: 34.5 G/DL (ref 31.5–35.7)
MCV RBC AUTO: 84 FL (ref 79–97)
MONOCYTES # BLD AUTO: 0.5 X10E3/UL (ref 0.1–0.9)
MONOCYTES NFR BLD AUTO: 11 %
NEUTROPHILS # BLD AUTO: 2.3 X10E3/UL (ref 1.4–7)
NEUTROPHILS NFR BLD AUTO: 53 %
PLATELET # BLD AUTO: 179 X10E3/UL (ref 150–450)
POTASSIUM SERPL-SCNC: 4 MMOL/L (ref 3.5–5.2)
PROT SERPL-MCNC: 7.4 G/DL (ref 6–8.5)
RBC # BLD AUTO: 5.13 X10E6/UL (ref 4.14–5.8)
SODIUM SERPL-SCNC: 142 MMOL/L (ref 134–144)
WBC # BLD AUTO: 4.4 X10E3/UL (ref 3.4–10.8)

## 2020-10-13 LAB — HCV RNA SERPL QL NAA+PROBE: NEGATIVE

## 2020-10-13 NOTE — PROGRESS NOTES
Called patient with blood work results. The liver numbers are all normal and the virus is currently undetectable.  Repeat testing will be done when he returns in Jan.

## 2020-12-02 NOTE — PROGRESS NOTES
Machelle Farfan is a 74 y.o. male who presents to the office today with the following:  Chief Complaint   Patient presents with    Suture Removal       Allergies   Allergen Reactions    Pcn [Penicillins] Rash       Current Outpatient Prescriptions   Medication Sig    carvedilol (COREG) 12.5 mg tablet TAKE 2 AM  1 PM    doxycycline (ADOXA) 100 mg tablet Take 1 Tab by mouth two (2) times a day for 10 days.  terazosin (HYTRIN) 2 mg capsule TAKE 1 CAPSULE NIGHTLY    chlorthalidone (HYGROTEN) 25 mg tablet Patients takes 1/2 tab q day    atorvastatin (LIPITOR) 10 mg tablet Take 1 Tab by mouth daily.  amLODIPine-benazepril (LOTREL) 10-40 mg per capsule TAKE 1 CAPSULE DAILY    zolpidem (AMBIEN) 10 mg tablet Take 1 Tab by mouth nightly as needed for Sleep. Max Daily Amount: 10 mg.    omeprazole (PRILOSEC) 10 mg capsule Take 10 mg by mouth daily.  aspirin delayed-release 81 mg tablet Take 1 Tab by mouth daily. No current facility-administered medications for this visit. Past Medical History:   Diagnosis Date    Asymptomatic gallstones 6/8/2017    GERD (gastroesophageal reflux disease)     Heart murmur     Hepatitis     type C    Hypertension     Mitral regurgitation     Multiple renal cysts 6/8/2017    Multiple right renal cysts Largest 8.3 cm on ultrasound June 2017       No past surgical history on file. History   Smoking Status    Former Smoker    Quit date: 1/1/1986   Smokeless Tobacco    Never Used       Family History   Problem Relation Age of Onset    Hypertension Mother     Cancer Father     Hypertension Father          History of Present Illness:  Patient here for follow-up from his lesion removal last week    Patient with a history of actinic sun damage. I did remove a scaly slightly pearly lesion from his right forearm. Biopsy did come back consistent with basal cell carcinoma. Margin of excision were clear of tumor. He tolerated the procedure well.   He does have some irritation and erythema at the site of the excision. Patient previously had a basal cell carcinoma removed from his shoulder. I have also frozen actinic keratoses on him in the past.  I have discussed importance of sun precaution. I would like him to be seen by dermatology for a full skin exam in light of his actinic damage. He is willing to do so now. Patient does have hypertension. Compliant with his medication. Blood pressures have been borderline last couple visits elevated again today. I am going to increase his carvedilol. He will continue with his Hytrin Lotrel and chlorthalidone. He will be back next month for recheck    Patient has a history of paroxysmal atrial fibrillation. In sinus rhythm today. He has declined anticoagulation other than aspirin      Review of Systems:    Review of systems negative except as noted above      Physical Exam:  Visit Vitals    /90    Pulse 77    Temp 98 °F (36.7 °C) (Oral)    Resp 18    Ht 5' 9\" (1.753 m)    Wt 181 lb (82.1 kg)    SpO2 98%    BMI 26.73 kg/m2     Vitals:    07/17/18 1624 07/17/18 1644   BP: (!) 172/104 160/90   BP 1 Location: Left arm    BP Patient Position: Sitting    Pulse: 77    Resp: 18    Temp: 98 °F (36.7 °C)    TempSrc: Oral    SpO2: 98%    Weight: 181 lb (82.1 kg)    Height: 5' 9\" (1.753 m)      Patient was in no acute distress. Vital signs as above on recheck  Cor regular rate and rhythm sinus rhythm today  Right forearm excision site revealed edges well approximated. There was some mild surrounding erythema. A bit of whitish exudate suture line. .  Stitches removed ×4    Assessment/Plan:  1. Essential hypertension  Patient will continue his current medication. I am increasing his Coreg. He will be back in 1 month for recheck. Follow-up of his blood pressure  - carvedilol (COREG) 12.5 mg tablet; TAKE 2 AM  1 PM  Dispense: 180 Tab; Refill: 1    2. Visit for suture removal  Stitches removed today.   Exam most consistent with irritation from the stitches however given the slight erythema I am going to cover him with antibiotics. He is allergic to penicillin. I have chosen doxycycline    3. Basal cell carcinoma (BCC) of skin of right upper extremity including shoulder    - REFERRAL TO DERMATOLOGY    4. AK (actinic keratosis)    - REFERRAL TO DERMATOLOGY    Patient Instructions   Same meds  Doxycycline x 10 days  Increase carvedalol to 2 AM 1 PM  Recheck 1 month    Referral to dermatology          Continue current therapy plan except for indicated above. Verbal and written instructions (see AVS) provided.  Patient expresses understanding of diagnosis and treatment plan. Follow-up Disposition:  Return in about 1 month (around 8/17/2018). Harjit Cole.  Delmar Boeck, MD insulin, tylenol, protonix

## 2021-03-02 ENCOUNTER — OFFICE VISIT (OUTPATIENT)
Dept: HEMATOLOGY | Age: 71
End: 2021-03-02

## 2021-03-02 VITALS
RESPIRATION RATE: 18 BRPM | OXYGEN SATURATION: 98 % | BODY MASS INDEX: 28.58 KG/M2 | DIASTOLIC BLOOD PRESSURE: 92 MMHG | HEART RATE: 63 BPM | HEIGHT: 69 IN | SYSTOLIC BLOOD PRESSURE: 155 MMHG | TEMPERATURE: 97.2 F | WEIGHT: 193 LBS

## 2021-03-02 DIAGNOSIS — B18.2 HEP C W/O COMA, CHRONIC (HCC): Primary | ICD-10-CM

## 2021-03-02 LAB
ALBUMIN SERPL-MCNC: 4.4 G/DL (ref 3.5–5)
ALBUMIN/GLOB SERPL: 1.2 {RATIO} (ref 1.1–2.2)
ALP SERPL-CCNC: 63 U/L (ref 45–117)
ALT SERPL-CCNC: 31 U/L (ref 12–78)
ANION GAP SERPL CALC-SCNC: 8 MMOL/L (ref 5–15)
AST SERPL-CCNC: 18 U/L (ref 15–37)
BASOPHILS # BLD: 0 K/UL (ref 0–0.1)
BASOPHILS NFR BLD: 0 % (ref 0–1)
BILIRUB DIRECT SERPL-MCNC: 0.3 MG/DL (ref 0–0.2)
BILIRUB SERPL-MCNC: 1 MG/DL (ref 0.2–1)
BUN SERPL-MCNC: 13 MG/DL (ref 6–20)
BUN/CREAT SERPL: 13 (ref 12–20)
CALCIUM SERPL-MCNC: 9.8 MG/DL (ref 8.5–10.1)
CHLORIDE SERPL-SCNC: 109 MMOL/L (ref 97–108)
CO2 SERPL-SCNC: 24 MMOL/L (ref 21–32)
CREAT SERPL-MCNC: 1.03 MG/DL (ref 0.7–1.3)
DIFFERENTIAL METHOD BLD: ABNORMAL
EOSINOPHIL # BLD: 0.2 K/UL (ref 0–0.4)
EOSINOPHIL NFR BLD: 4 % (ref 0–7)
ERYTHROCYTE [DISTWIDTH] IN BLOOD BY AUTOMATED COUNT: 13.1 % (ref 11.5–14.5)
GLOBULIN SER CALC-MCNC: 3.6 G/DL (ref 2–4)
GLUCOSE SERPL-MCNC: 106 MG/DL (ref 65–100)
HCT VFR BLD AUTO: 44.7 % (ref 36.6–50.3)
HGB BLD-MCNC: 14.9 G/DL (ref 12.1–17)
IMM GRANULOCYTES # BLD AUTO: 0 K/UL (ref 0–0.04)
IMM GRANULOCYTES NFR BLD AUTO: 0 % (ref 0–0.5)
LYMPHOCYTES # BLD: 1.5 K/UL (ref 0.8–3.5)
LYMPHOCYTES NFR BLD: 32 % (ref 12–49)
MCH RBC QN AUTO: 28 PG (ref 26–34)
MCHC RBC AUTO-ENTMCNC: 33.3 G/DL (ref 30–36.5)
MCV RBC AUTO: 83.9 FL (ref 80–99)
MONOCYTES # BLD: 0.5 K/UL (ref 0–1)
MONOCYTES NFR BLD: 11 % (ref 5–13)
NEUTS SEG # BLD: 2.5 K/UL (ref 1.8–8)
NEUTS SEG NFR BLD: 53 % (ref 32–75)
NRBC # BLD: 0 K/UL (ref 0–0.01)
NRBC BLD-RTO: 0 PER 100 WBC
PLATELET # BLD AUTO: 145 K/UL (ref 150–400)
PMV BLD AUTO: 10.9 FL (ref 8.9–12.9)
POTASSIUM SERPL-SCNC: 4.2 MMOL/L (ref 3.5–5.1)
PROT SERPL-MCNC: 8 G/DL (ref 6.4–8.2)
RBC # BLD AUTO: 5.33 M/UL (ref 4.1–5.7)
SODIUM SERPL-SCNC: 141 MMOL/L (ref 136–145)
WBC # BLD AUTO: 4.8 K/UL (ref 4.1–11.1)

## 2021-03-02 PROCEDURE — G8427 DOCREV CUR MEDS BY ELIG CLIN: HCPCS | Performed by: NURSE PRACTITIONER

## 2021-03-02 PROCEDURE — 1101F PT FALLS ASSESS-DOCD LE1/YR: CPT | Performed by: NURSE PRACTITIONER

## 2021-03-02 PROCEDURE — G8432 DEP SCR NOT DOC, RNG: HCPCS | Performed by: NURSE PRACTITIONER

## 2021-03-02 PROCEDURE — G8419 CALC BMI OUT NRM PARAM NOF/U: HCPCS | Performed by: NURSE PRACTITIONER

## 2021-03-02 PROCEDURE — 99213 OFFICE O/P EST LOW 20 MIN: CPT | Performed by: NURSE PRACTITIONER

## 2021-03-02 PROCEDURE — 3017F COLORECTAL CA SCREEN DOC REV: CPT | Performed by: NURSE PRACTITIONER

## 2021-03-02 PROCEDURE — G8755 DIAS BP > OR = 90: HCPCS | Performed by: NURSE PRACTITIONER

## 2021-03-02 PROCEDURE — G8536 NO DOC ELDER MAL SCRN: HCPCS | Performed by: NURSE PRACTITIONER

## 2021-03-02 PROCEDURE — G8753 SYS BP > OR = 140: HCPCS | Performed by: NURSE PRACTITIONER

## 2021-03-02 NOTE — PROGRESS NOTES
Elisa Simpson MD, MD Lidia West, PAOMARI Jeong, [facility]-BC     April S Sandra, [facility]   Izora Hashimoto, FNP-SADAF Brush, [facility]       Adele Correa [facility] 136    at [place] [address] [address] [facility], [address] [place] [address], [facility] 22.    [phone]    FAX: [address] [place] [address]    at [address], [address], [address]    [phone]    FAX: [phone]         Patient Care Team:  Mini Alford MD as PCP - General (Family Medicine)  Mini Alford MD as PCP - [facility]      Problem List  Date Reviewed: 10/9/2020          Codes Class Noted    Dyslipidemia ICD-10-CM: E78.5  ICD-9-CM: 272.4  7/24/2020        Gastroesophageal reflux disease with esophagitis ICD-10-CM: K21.00  ICD-9-CM: 530.11  6/3/2019        BMI 27.0-27.9,adult ICD-10-CM: Q26.47  ICD-9-CM: V85.23  7/2/2018        PAF (paroxysmal atrial fibrillation) ([facility] ICD-10-CM: I48.0  ICD-9-CM: 427.31  2/12/2018        Basal cell carcinoma ICD-10-CM: C44.91  ICD-9-CM: 173.91  7/5/2017        TAMERA (left atrial hypertrophy) ICD-10-CM: I51.7  ICD-9-CM: 429.3  6/28/2017        Calculus of gallbladder without cholecystitis without obstruction ICD-10-CM: K80.20  ICD-9-CM: 574.20  6/28/2017        Renal cyst ICD-10-CM: N28.1  ICD-9-CM: 753.10  6/28/2017        Multiple renal cysts ICD-10-CM: Q61.02  ICD-9-CM: 753.19  6/8/2017    Overview Signed 6/8/2017  6:56 PM by Iraj Estrella     Multiple right renal cysts  Largest 8.3 cm on ultrasound June 2017             Essential hypertension ICD-10-CM: I10  ICD-9-CM: 401.9  5/31/2017        Hep C w/o coma, chronic ([facility] ICD-10-CM: B18.2  ICD-9-CM: 070.54  5/31/2017        Murmur, heart ICD-10-CM: R01.1  ICD-9-CM: 785.2  5/31/2017        Insomnia ICD-10-CM: G47.00  ICD-9-CM: 780.52  5/31/2017        Benign non-nodular prostatic hyperplasia with lower urinary tract symptoms ICD-10-CM: N40.1  ICD-9-CM: 600.91  5/31/2017              Moe Koch is being seen at [place] [address] of [facility] for management of chronic HCV. The active problem list, all pertinent past medical history, medications, radiologic findings and laboratory findings related to the liver disorder were reviewed with the patient. The patient is a 78 y.o.  male who was screened for anti-HCV and tested positive after wife was diagnosed in 2000. Risk factors for acquiring HCV are not apparent     There was no history of acute icteric hepatitis at the time of these risk factors. Ultrasound of the liver was performed in 7/2020. The results of the imaging demonstrated a normal appearing liver. Assessment of liver fibrosis was performed with Fibroscan 8/2020. The result was 7.0 kPa which correlates with stage 1 portal fibrosis to stage 2 septal fibrosis. The CAP score of 340 suggests fatty liver. The patient completed an intended 12 weeks of hepatitis C treatment with Epclusa 9/04/2020-12/2020. The 4 week HCV RNA was undetectable. He returns for repeat testing to evaluate ongoing response to treatment. Since the last office visit he has no new complaints and feels well overall. The patient has no symptoms which can be attributed to the liver disorder. The patient is not currently experiencing the following symptoms of liver disease:  fatigue, pain in the right side over the liver, yellowing of the eyes or skin, or swelling of the abdomen,     The patient completes all daily activities without any functional limitations. ASSESSMENT AND PLAN:  Chronic HCV   Chronic HCV with stage 1 portal fibrosis to stage 2 septal fibrosis.      Severity of the liver disease was assessed by laboratory studies, Imaging, and elastography in 8/2020. Rafal Lemus Liver transaminases are now normal. ALP, and Liver function are normal. The Platelet count is depressed. Based upon laboratory studies, Fibroscan, and imaging  the patient does not appear to have significant liver injury. Will perform laboratory testing to monitor liver function and degree of liver injury. This will include BMP, hepatic panel, CBC with platelet count, and HCV RNA. Previous laboratory testing reviewed with the patient and added to the clinic note. Chronic HCV Treatment  The patient has HCV genotype 1A. The patient completed an intended 12 weeks of hepatitis C treatment with Epclusa (sofosbuvir and valpitasvir) 9/04/2020-12/2020. The 4 week HCV RNA was undetectable. This will be repeated today to evaluate ongoing response to treatment. If the HCV RNA remains undetectable today and at the next office visit he will be considered a sustained viral responder and cured of hepatitis C. Will continue to follow and repeat Fibroscan in 6 months. Screening for Hepatocellular Carcinoma  HCC screening is not necessary if the patient has no evidence of cirrhosis. Treatment of other medical problems in patients with chronic liver disease  There are no contraindications for the patient to take most medications that are necessary for treatment of other medical issues. The patient can take: Any medications utilized for treatment of DM. Statins to treat hypercholesterolemia    The patient does not comsume alcohol on a daily basis. Normal doses of acetaminophen, as recommended on the label of the bottle, are not hepatotoxic except in the setting of daily alcohol use, even in patients with cirrhosis and can be utilized for pain. Counseling for alcohol in patients with chronic liver disease  The patient was counseled regarding alcohol consumption and the effect of alcohol on chronic liver disease.  The patient does not consume any significant amount of alcohol. Vaccinations   Vaccination for viral hepatitis B is recommended since the patient has no serologic evidence of previous exposure or vaccination with immunity. Vaccination for viral hepatitis A is not needed. The patient has serologic evidence of prior exposure or vaccination with immunity. Routine vaccinations against other bacterial and viral agents can be performed as indicated. Annual flu vaccination should be administered if indicated. ALLERGIES  Allergies   Allergen Reactions    Pcn [Penicillins] Rash       MEDICATIONS  Current Outpatient Medications   Medication Sig    amLODIPine-benazepril (LOTREL) 10-40 mg per capsule Take 1 capsule by mouth once daily    carvediloL (COREG) 25 mg tablet TAKE 1 TABLET BY MOUTH TWICE DAILY WITH MEALS    terazosin (HYTRIN) 5 mg capsule TAKE 1 CAPSULE BY MOUTH ONCE DAILY AT NIGHT    atorvastatin (LIPITOR) 10 mg tablet Take 1 tablet by mouth once daily    omeprazole (PRILOSEC OTC) 20 mg tablet Take 20 mg by mouth daily.  chlorthalidone (HYGROTEN) 25 mg tablet Patients takes 1/2 tab q day    aspirin delayed-release 81 mg tablet Take 1 Tab by mouth daily. No current facility-administered medications for this visit. [employer] SYSTEM REVIEW NOT RELATED TO LIVER DISEASE OR REVIEWED ABOVE:  No new complaints    FAMILY HISTORY:  Reviewed and unchanged. The father  of lung cancer. The mother  of natural causes. There is no family history of liver disease. There is no family history of immune disorders. SOCIAL HISTORY:  Reviewed and unchanged  The patient is . The spouse was treated and cured of hepatitis C in . The patient has 3 children. The patient stopped using tobacco products in . The patient consumes 2-3 alcoholic beverages per day. The patient retired in 2017. Previous employment [employer].      PHYSICAL EXAMINATION:  Visit Vitals  BP (!) 155/92   Pulse 63   Temp 97.2 °F (36.2 °C) (Temporal)   Resp 18   Ht 5' 9\" (1.753 m)   Wt 193 lb (87.5 kg)   SpO2 98%   BMI 28.50 kg/m²       General: No acute distress. Eyes: Sclera anicteric. ENT: No oral lesions. Thyroid normal.  Nodes: No adenopathy. Skin: No spider angiomata. No jaundice. No palmar erythema. Respiratory: Lungs clear to auscultation. Cardiovascular: Regular heart rate. No murmurs. No JVD. Abdomen: Soft non-tender, liver size normal to percussion/palpation. Spleen not palpable. No obvious ascites. Extremities: No edema. No muscle wasting. No gross arthritic changes. Neurologic: Alert and oriented. Cranial nerves grossly intact. No asterixis. LABORATORY STUDIES:  [address] & Units 3/2/2021 10/9/2020   WBC 4.1 - 11.1 K/uL 4.8 4.4   ANC 1.8 - 8.0 K/UL 2.5 2.3   HGB 12.1 - 17.0 g/dL 14.9 14.9    - 400 K/uL 145 (L) 179   AST 15 - 37 U/L 18 15   ALT 12 - 78 U/L 31 19   Alk Phos 45 - 117 U/L 63 65   Bili, Total 0.2 - 1.0 MG/DL 1.0 0.7   Bili, Direct 0.0 - 0.2 MG/DL 0.3 (H) 0.20   Albumin 3.5 - 5.0 g/dL 4.4 4.6   BUN 6 - 20 MG/DL 13 12   Creat 0.70 - 1.30 MG/DL 1.03 1.09   Na 136 - 145 mmol/L 141 142   K 3.5 - 5.1 mmol/L 4.2 4.0   Cl 97 - 108 mmol/L 109 (H) 108 (H)   CO2 21 - 32 mmol/L 24 20   Glucose 65 - 100 mg/dL 106 (H) 123 (H)     Liver [facility] Latest Ref Rng & Units 6/19/2020   WBC 4.1 - 11.1 K/uL 4.0   ANC 1.8 - 8.0 K/UL 1.9   HGB 12.1 - 17.0 g/dL 15.3    - 400 K/uL 159   AST 15 - 37 U/L 45 (H)   ALT 12 - 78 U/L 50 (H)   Alk Phos 45 - 117 U/L 56   Bili, Total 0.2 - 1.0 MG/DL 1.2   Bili, Direct 0.0 - 0.2 MG/DL    Albumin 3.5 - 5.0 g/dL 4.8   BUN 6 - 20 MG/DL 17   Creat 0.70 - 1.30 MG/DL 0.95   Na 136 - 145 mmol/L 137   K 3.5 - 5.1 mmol/L CANCELED   Cl 97 - 108 mmol/L 101   CO2 21 - 32 mmol/L 21   Glucose 65 - 100 mg/dL CANCELED     Liver enzymes normalized with treatment and remain normal with testing completed today.  The results will be shared with the patient through My Chart. SEROLOGIES:  Serologies Latest Ref Rng & Units 7/24/2020 6/19/2020   Hep A Ab, Total Negative Positive (A)    Hep B Surface Ag Negative Negative    Hep B Core Ab, Total Negative Negative    Hep B Surface AB QL  Non Reactive    Hep C Genotype   1a   HCV RT-PCR, Quant IU/mL  3,090,000   HCV Log10 log10 IU/mL  6.490   Ferritin 30 - 400 ng/mL 66    Iron % Saturation 15 - 55 % 15      Virology Latest Ref Rng & Units 10/9/2020   HCV RNA, JESSICA, QL Negative Negative     The 4 week HCV RNA was undetectable showing response to treatment. A repeat HCV RNA was ordered today. LIVER HISTOLOGY:  8/2020. [facility]. EkPa was 7.0. IQR/med 7%. . The CAP score suggests fatty liver. ENDOSCOPIC PROCEDURES:  Not available or performed    RADIOLOGY:  7/2020. Ultrasound of liver. Increased echogenicity consistent with steatosis. No ductal dilatation. Splenomegaly. Right renal cyst 7.3 cm x 7.2 cm x 7.6 cm in size in the upper pole. The left renal cyst measures 4.5 cm x 4.2 cm x 3.2 cm in size in the upper pole. OTHER TESTING:  Not available or performed    FOLLOW-UP:  All of the issues listed above in the Assessment and Plan were discussed with the patient. All questions were answered. The patient expressed a clear understanding of the above. [facility] 87 in 3 months to evaluate ongoing response to treatment. If the HCV RNA remains undetectable with testing ordered today and the next office visit he will be considered a sustained viral responder and cured of hepatitis C. My previous note dated 10/09/2020 was reviewed in detail and appropriate changes were made to update information. All other information will remain for continuation of care. SRINI Rubio-BC  [place] [id number] [address]   Kenny Crockett  22.  [address] [facility]

## 2021-03-02 NOTE — PROGRESS NOTES
Identified pt with two pt identifiers(name and ). Reviewed record in preparation for visit and have obtained necessary documentation. Chief Complaint   Patient presents with    Hepatitis C     f/u      Vitals:    21 0808 21 0815   BP: (!) 174/102 (!) 155/92   Pulse: 63    Resp: 18    Temp: 97.2 °F (36.2 °C)    TempSrc: Temporal    SpO2: 98%    Weight: 193 lb (87.5 kg)    Height: 5' 9\" (1.753 m)    PainSc:   0 - No pain    Pt advised to address elevated BP readings with PCP. Pt verbalized understanding. Health Maintenance Review: Patient reminded of \"due or due soon\" health maintenance. I have asked the patient to contact his/her primary care provider (PCP) for follow-up on his/her health maintenance. Coordination of Care Questionnaire:  :   1) Have you been to an emergency room, urgent care, or hospitalized since your last visit? If yes, where when, and reason for visit? no       2. Have seen or consulted any other health care provider since your last visit? If yes, where when, and reason for visit? NO      Patient is accompanied by self I have received verbal consent from Canmer Minors to discuss any/all medical information while they are present in the room.

## 2021-03-04 LAB
HCV RNA SERPL QL NAA+PROBE: NEGATIVE
SERIAL MONITORING: NORMAL

## 2021-03-08 NOTE — PROGRESS NOTES
Letter sent to the patient regarding the blood work results. The liver numbers are normal and the hepatitis C remains undetectable. We will see him back in 3 months for repeat testing.

## 2021-03-10 ENCOUNTER — OFFICE VISIT (OUTPATIENT)
Dept: FAMILY MEDICINE CLINIC | Age: 71
End: 2021-03-10
Payer: MEDICARE

## 2021-03-10 VITALS
HEIGHT: 69 IN | HEART RATE: 60 BPM | OXYGEN SATURATION: 98 % | SYSTOLIC BLOOD PRESSURE: 180 MMHG | BODY MASS INDEX: 28.2 KG/M2 | WEIGHT: 190.38 LBS | TEMPERATURE: 98.3 F | DIASTOLIC BLOOD PRESSURE: 100 MMHG | RESPIRATION RATE: 18 BRPM

## 2021-03-10 DIAGNOSIS — N40.1 BENIGN NON-NODULAR PROSTATIC HYPERPLASIA WITH LOWER URINARY TRACT SYMPTOMS: ICD-10-CM

## 2021-03-10 DIAGNOSIS — E78.00 PURE HYPERCHOLESTEROLEMIA: ICD-10-CM

## 2021-03-10 DIAGNOSIS — I10 ESSENTIAL HYPERTENSION: ICD-10-CM

## 2021-03-10 PROCEDURE — G8510 SCR DEP NEG, NO PLAN REQD: HCPCS | Performed by: FAMILY MEDICINE

## 2021-03-10 PROCEDURE — 99214 OFFICE O/P EST MOD 30 MIN: CPT | Performed by: FAMILY MEDICINE

## 2021-03-10 PROCEDURE — 3017F COLORECTAL CA SCREEN DOC REV: CPT | Performed by: FAMILY MEDICINE

## 2021-03-10 PROCEDURE — G8755 DIAS BP > OR = 90: HCPCS | Performed by: FAMILY MEDICINE

## 2021-03-10 PROCEDURE — 1101F PT FALLS ASSESS-DOCD LE1/YR: CPT | Performed by: FAMILY MEDICINE

## 2021-03-10 PROCEDURE — G8419 CALC BMI OUT NRM PARAM NOF/U: HCPCS | Performed by: FAMILY MEDICINE

## 2021-03-10 PROCEDURE — G8536 NO DOC ELDER MAL SCRN: HCPCS | Performed by: FAMILY MEDICINE

## 2021-03-10 PROCEDURE — G8753 SYS BP > OR = 140: HCPCS | Performed by: FAMILY MEDICINE

## 2021-03-10 PROCEDURE — G8427 DOCREV CUR MEDS BY ELIG CLIN: HCPCS | Performed by: FAMILY MEDICINE

## 2021-03-10 RX ORDER — AMLODIPINE BESYLATE AND BENAZEPRIL HYDROCHLORIDE 10; 40 MG/1; MG/1
1 CAPSULE ORAL DAILY
Qty: 90 CAP | Refills: 1 | Status: SHIPPED | OUTPATIENT
Start: 2021-03-10 | End: 2021-09-17

## 2021-03-10 RX ORDER — TRIAMTERENE/HYDROCHLOROTHIAZID 37.5-25 MG
1 TABLET ORAL DAILY
Qty: 90 TAB | Refills: 1 | Status: SHIPPED | OUTPATIENT
Start: 2021-03-10 | End: 2021-09-17

## 2021-03-10 RX ORDER — ATORVASTATIN CALCIUM 10 MG/1
10 TABLET, FILM COATED ORAL DAILY
Qty: 90 TAB | Refills: 1 | Status: SHIPPED | OUTPATIENT
Start: 2021-03-10 | End: 2021-09-17

## 2021-03-10 RX ORDER — TERAZOSIN 10 MG/1
10 CAPSULE ORAL
Qty: 90 CAP | Refills: 1 | Status: SHIPPED | OUTPATIENT
Start: 2021-03-10 | End: 2021-09-17

## 2021-03-10 RX ORDER — CARVEDILOL 25 MG/1
25 TABLET ORAL 2 TIMES DAILY
Qty: 180 TAB | Refills: 1 | Status: SHIPPED | OUTPATIENT
Start: 2021-03-10

## 2021-03-10 NOTE — PROGRESS NOTES
1. Have you been to the ER, urgent care clinic since your last visit? Hospitalized since your last visit? No    2. Have you seen or consulted any other health care providers outside of the [address] [place] [address] since your last visit? Include any pap smears or colon screening. No     3/10/2021      Chief Complaint   Patient presents with    Hypertension         History of Present Illness:         is a 78 y.o. male who returns to discuss BP which has been consistently elevated over the last year. Lower at home, but still in 140s at times and occasionally 150s. Feels well, remains virus free post Hep C treatment. Has been taking chlorthalidone whole tab for the last week [facility]. Allergies   Allergen Reactions    Pcn [Penicillins] Rash       Current Outpatient Medications   Medication Sig    terazosin (HYTRIN) 10 mg capsule Take 1 Cap by mouth nightly.  triamterene-hydroCHLOROthiazide (MAXZIDE) 37.5-25 mg per tablet Take 1 Tab by mouth daily.  amLODIPine-benazepril (LOTREL) 10-40 mg per capsule Take 1 Cap by mouth daily.  atorvastatin (LIPITOR) 10 mg tablet Take 1 Tab by mouth daily.  carvediloL (COREG) 25 mg tablet Take 1 Tab by mouth two (2) times a day.  omeprazole (PRILOSEC OTC) 20 mg tablet Take 20 mg by mouth daily.  aspirin delayed-release 81 mg tablet Take 1 Tab by mouth daily. No current facility-administered medications for this visit. Physical Examination:    Visit Vitals  BP (!) 180/100 (BP 1 Location: Left upper arm, BP Patient Position: Sitting, BP Cuff Size: Adult)   Pulse 60   Temp 98.3 °F (36.8 °C) (Oral)   Resp 18   Ht 5' 9\" (1.753 m)   Wt 190 lb 6 oz (86.4 kg)   SpO2 98%   BMI 28.11 kg/m²      General:  Alert, cooperative, no distress. HEENT:  Normocephalic, without obvious abnormality, atraumatic. Conjunctivae/corneas clear. Pupils equal, round, reactive to light. Extraocular movements intact. TMs and external canals normal bilaterally. Nasal mucosa and oropharynx clear. Lungs: Clear to auscultation bilaterally. Chest wall:  No tenderness or deformity. Heart:  Regular rate and rhythm, S1, S2 normal, no murmur, click, rub, or gallop. Abdomen:   Soft, non-tender. Bowel sounds normal. No masses. No organomegaly. Extremities: Extremities normal, atraumatic, no cyanosis or edema. Pulses: 2+ and symmetric all extremities. Skin: Skin color, texture, turgor normal. No rashes or lesions. Lymph nodes: Cervical, supraclavicular, and axillary nodes normal.   Neurologic: CNII-XII intact. Normal strength, sensation, and reflexes throughout. ASSESSMENT AND PLAN    1. Essential hypertension  Increase terazosin and switch chlorthalidone to triam/HCTZ. Home monitor and recheck here in June. If still struggling to maintain control will work up for secondary sources of HTN. - terazosin (HYTRIN) 10 mg capsule; Take 1 Cap by mouth nightly. Dispense: 90 Cap; Refill: 1  - triamterene-hydroCHLOROthiazide (MAXZIDE) 37.5-25 mg per tablet; Take 1 Tab by mouth daily. Dispense: 90 Tab; Refill: 1  - amLODIPine-benazepril (LOTREL) 10-40 mg per capsule; Take 1 Cap by mouth daily. Dispense: 90 Cap; Refill: 1  - carvediloL (COREG) 25 mg tablet; Take 1 Tab by mouth two (2) times a day. Dispense: 180 Tab; Refill: 1    2. Benign non-nodular prostatic hyperplasia with lower urinary tract symptoms    - terazosin (HYTRIN) 10 mg capsule; Take 1 Cap by mouth nightly. Dispense: 90 Cap; Refill: 1    3. Pure hypercholesterolemia    - atorvastatin (LIPITOR) 10 mg tablet; Take 1 Tab by mouth daily. Dispense: 90 Tab; Refill: 1          Orders Placed This Encounter    terazosin (HYTRIN) 10 mg capsule     Sig: Take 1 Cap by mouth nightly. Dispense:  90 Cap     Refill:  1    triamterene-hydroCHLOROthiazide (MAXZIDE) 37.5-25 mg per tablet     Sig: Take 1 Tab by mouth daily.      Dispense:  90 Tab     Refill:  1    amLODIPine-benazepril (LOTREL) 10-40 mg per capsule     Sig: Take 1 Cap by mouth daily. Dispense:  90 Cap     Refill:  1    atorvastatin (LIPITOR) 10 mg tablet     Sig: Take 1 Tab by mouth daily. Dispense:  90 Tab     Refill:  1    carvediloL (COREG) 25 mg tablet     Sig: Take 1 Tab by mouth two (2) times a day.      Dispense:  180 Tab     Refill:  1       RTC 3 months    Zach Funk MD

## 2021-06-09 ENCOUNTER — OFFICE VISIT (OUTPATIENT)
Dept: FAMILY MEDICINE CLINIC | Age: 71
End: 2021-06-09
Payer: MEDICARE

## 2021-06-09 VITALS
WEIGHT: 187.2 LBS | BODY MASS INDEX: 27.73 KG/M2 | DIASTOLIC BLOOD PRESSURE: 80 MMHG | SYSTOLIC BLOOD PRESSURE: 130 MMHG | RESPIRATION RATE: 16 BRPM | OXYGEN SATURATION: 96 % | HEIGHT: 69 IN | HEART RATE: 56 BPM | TEMPERATURE: 97.9 F

## 2021-06-09 DIAGNOSIS — Z00.00 MEDICARE ANNUAL WELLNESS VISIT, SUBSEQUENT: Primary | ICD-10-CM

## 2021-06-09 DIAGNOSIS — I48.0 PAROXYSMAL ATRIAL FIBRILLATION (HCC): ICD-10-CM

## 2021-06-09 DIAGNOSIS — E78.00 PURE HYPERCHOLESTEROLEMIA: ICD-10-CM

## 2021-06-09 DIAGNOSIS — G47.25 JET LAG SYNDROME: ICD-10-CM

## 2021-06-09 DIAGNOSIS — I10 ESSENTIAL HYPERTENSION: ICD-10-CM

## 2021-06-09 LAB
ALBUMIN SERPL-MCNC: 4.5 G/DL (ref 3.5–5)
ALBUMIN/GLOB SERPL: 1.3 {RATIO} (ref 1.1–2.2)
ALP SERPL-CCNC: 60 U/L (ref 45–117)
ALT SERPL-CCNC: 22 U/L (ref 12–78)
ANION GAP SERPL CALC-SCNC: 8 MMOL/L (ref 5–15)
AST SERPL-CCNC: 15 U/L (ref 15–37)
BILIRUB SERPL-MCNC: 1.2 MG/DL (ref 0.2–1)
BUN SERPL-MCNC: 18 MG/DL (ref 6–20)
BUN/CREAT SERPL: 16 (ref 12–20)
CALCIUM SERPL-MCNC: 10 MG/DL (ref 8.5–10.1)
CHLORIDE SERPL-SCNC: 108 MMOL/L (ref 97–108)
CHOLEST SERPL-MCNC: 142 MG/DL
CO2 SERPL-SCNC: 24 MMOL/L (ref 21–32)
CREAT SERPL-MCNC: 1.1 MG/DL (ref 0.7–1.3)
GLOBULIN SER CALC-MCNC: 3.4 G/DL (ref 2–4)
GLUCOSE SERPL-MCNC: 101 MG/DL (ref 65–100)
HDLC SERPL-MCNC: 58 MG/DL
HDLC SERPL: 2.4 {RATIO} (ref 0–5)
LDLC SERPL CALC-MCNC: 70 MG/DL (ref 0–100)
POTASSIUM SERPL-SCNC: 4 MMOL/L (ref 3.5–5.1)
PROT SERPL-MCNC: 7.9 G/DL (ref 6.4–8.2)
SODIUM SERPL-SCNC: 140 MMOL/L (ref 136–145)
TRIGL SERPL-MCNC: 70 MG/DL (ref ?–150)
VLDLC SERPL CALC-MCNC: 14 MG/DL

## 2021-06-09 PROCEDURE — G8752 SYS BP LESS 140: HCPCS | Performed by: FAMILY MEDICINE

## 2021-06-09 PROCEDURE — 3017F COLORECTAL CA SCREEN DOC REV: CPT | Performed by: FAMILY MEDICINE

## 2021-06-09 PROCEDURE — G8754 DIAS BP LESS 90: HCPCS | Performed by: FAMILY MEDICINE

## 2021-06-09 PROCEDURE — 99214 OFFICE O/P EST MOD 30 MIN: CPT | Performed by: FAMILY MEDICINE

## 2021-06-09 PROCEDURE — G8419 CALC BMI OUT NRM PARAM NOF/U: HCPCS | Performed by: FAMILY MEDICINE

## 2021-06-09 PROCEDURE — G0439 PPPS, SUBSEQ VISIT: HCPCS | Performed by: FAMILY MEDICINE

## 2021-06-09 PROCEDURE — 1101F PT FALLS ASSESS-DOCD LE1/YR: CPT | Performed by: FAMILY MEDICINE

## 2021-06-09 PROCEDURE — G8536 NO DOC ELDER MAL SCRN: HCPCS | Performed by: FAMILY MEDICINE

## 2021-06-09 PROCEDURE — G8510 SCR DEP NEG, NO PLAN REQD: HCPCS | Performed by: FAMILY MEDICINE

## 2021-06-09 PROCEDURE — 36415 COLL VENOUS BLD VENIPUNCTURE: CPT | Performed by: FAMILY MEDICINE

## 2021-06-09 PROCEDURE — G8427 DOCREV CUR MEDS BY ELIG CLIN: HCPCS | Performed by: FAMILY MEDICINE

## 2021-06-09 RX ORDER — FLUOROURACIL 50 MG/G
CREAM TOPICAL
COMMUNITY
Start: 2021-03-26

## 2021-06-09 RX ORDER — ZOLPIDEM TARTRATE 5 MG/1
5 TABLET ORAL
Qty: 10 TABLET | Refills: 0 | Status: SHIPPED | OUTPATIENT
Start: 2021-06-09

## 2021-06-09 NOTE — PROGRESS NOTES
6/9/2021      Chief Complaint   Patient presents with    Hypertension     3 month follow up    Annual Wellness Visit              History of Present Illness:         is a 78 y.o. male returns to recheck BP after addition of Triam/HCTZ. BP was very low at first, has since steadied in the 120s and 130s. Doesn't feel real well with fatigue. Getting ready for a trip to the [facility] and wonders if he can use some ambien for jet lag. Allergies   Allergen Reactions    Pcn [Penicillins] Rash       Current Outpatient Medications   Medication Sig    zolpidem (AMBIEN) 5 mg tablet Take 1 Tablet by mouth nightly as needed for Sleep. Max Daily Amount: 5 mg.  terazosin (HYTRIN) 10 mg capsule Take 1 Cap by mouth nightly.  triamterene-hydroCHLOROthiazide (MAXZIDE) 37.5-25 mg per tablet Take 1 Tab by mouth daily.  amLODIPine-benazepril (LOTREL) 10-40 mg per capsule Take 1 Cap by mouth daily.  atorvastatin (LIPITOR) 10 mg tablet Take 1 Tab by mouth daily.  carvediloL (COREG) 25 mg tablet Take 1 Tab by mouth two (2) times a day.  omeprazole (PRILOSEC OTC) 20 mg tablet Take 20 mg by mouth daily.  aspirin delayed-release 81 mg tablet Take 1 Tab by mouth daily.  fluorouraciL (EFUDEX) 5 % chemo cream  (Patient not taking: Reported on 6/9/2021)     No current facility-administered medications for this visit. Physical Examination:    Visit Vitals  /80   Pulse (!) 56   Temp 97.9 °F (36.6 °C) (Oral)   Resp 16   Ht 5' 9\" (1.753 m)   Wt 187 lb 3.2 oz (84.9 kg)   SpO2 96%   BMI 27.64 kg/m²      General:  Alert, cooperative, no distress. HEENT:  Normocephalic, without obvious abnormality, atraumatic. Conjunctivae/corneas clear. Pupils equal, round, reactive to light. Extraocular movements intact. TMs and external canals normal bilaterally. Nasal mucosa and oropharynx clear. Lungs: Clear to auscultation bilaterally. Chest wall:  No tenderness or deformity.    Heart:  Regular rate and rhythm, S1, S2 normal, no murmur, click, rub, or gallop. Abdomen:   Soft, non-tender. Bowel sounds normal. No masses. No organomegaly. Extremities: Extremities normal, atraumatic, no cyanosis or edema. Pulses: 2+ and symmetric all extremities. Skin: Skin color, texture, turgor normal. No rashes or lesions. Lymph nodes: Cervical, supraclavicular, and axillary nodes normal.   Neurologic: CNII-XII intact. Normal strength, sensation, and reflexes throughout. ASSESSMENT AND PLAN    1. Medicare annual wellness visit, subsequent    - PA HANDLG&/OR CONVEY OF SPEC FOR TR OFFICE TO LAB  - COLLECTION VENOUS BLOOD,VENIPUNCTURE    2. Essential hypertension  Reasonably good control but with patient not feeling well. He is on max dose of seven meds, so will investigate sources of secondary hypertension. R/o hyperaldosteronism and likely do renal MRA or CT angiogram  - RENIN ACTIVITY; Future  - ALDOSTERONE; Future  - LIPID PANEL; Future  - METABOLIC PANEL, COMPREHENSIVE; Future  - RENIN ACTIVITY  - METABOLIC PANEL, COMPREHENSIVE  - LIPID PANEL  - ALDOSTERONE    3. Paroxysmal atrial fibrillation (HCC)      4. Pure hypercholesterolemia      5. Jet lag syndrome    - zolpidem (AMBIEN) 5 mg tablet; Take 1 Tablet by mouth nightly as needed for Sleep. Max Daily Amount: 5 mg. Dispense: 10 Tablet;  Refill: 0          Orders Placed This Encounter    RENIN ACTIVITY     Standing Status:   Future     Number of Occurrences:   1     Standing Expiration Date:   6/9/2022    ALDOSTERONE     Standing Status:   Future     Number of Occurrences:   1     Standing Expiration Date:   6/9/2022    LIPID PANEL     Standing Status:   Future     Number of Occurrences:   1     Standing Expiration Date:   1/7/0276    METABOLIC PANEL, COMPREHENSIVE     Standing Status:   Future     Number of Occurrences:   1     Standing Expiration Date:   6/9/2022    PA HANDLG&/OR CONVEY OF SPEC FOR TR OFFICE TO LAB    COLLECTION VENOUS BLOOD,VENIPUNCTURE    fluorouraciL (EFUDEX) 5 % chemo cream    zolpidem (AMBIEN) 5 mg tablet     Sig: Take 1 Tablet by mouth nightly as needed for Sleep. Max Daily Amount: 5 mg.      Dispense:  10 Tablet     Refill:  0     RTC 3 months      Dani Mcintosh MD

## 2021-06-09 NOTE — ACP (ADVANCE CARE PLANNING)
Non-Provider Advance Care Planning (ACP) Note    Date of ACP Conversation: 6/9/2021  Persons included in Conversation: patient  Length of ACP Conversation in minutes: <16 minutes (Non-Billable)    Conversation requested by:   Provider    Authorized Decision Maker (if patient is incapable of making informed decisions): This person is:  Named in Advance Directive or [address] [facility] [address] for ALL Patients with Decision Making Capacity:    Advance Directive Conversation with Patients who have not yet planned:  N/A    Review of Existing Advance Directive: (Select questions covered)  Does this advance directive still reflect your preferences? Yes    Interventions Provided:  Patient has ACP and will bring it in to be scanned into chart.

## 2021-06-09 NOTE — PATIENT INSTRUCTIONS
Medicare Wellness Visit, Male The best way to live healthy is to have a lifestyle where you eat a well-balanced diet, exercise regularly, limit alcohol use, and quit all forms of tobacco/nicotine, if applicable. Regular preventive services are another way to keep healthy. Preventive services (vaccines, screening tests, monitoring & exams) can help personalize your care plan, which helps you manage your own care. Screening tests can find health problems at the earliest stages, when they are easiest to treat. [facility] follows the current, evidence-based guidelines published by the [facility] ([facility]) when recommending preventive services for our patients. Because we follow these guidelines, sometimes recommendations change over time as research supports it. (For example, a prostate screening blood test is no longer routinely recommended for men with no symptoms). Of course, you and your doctor may decide to screen more often for some diseases, based on your risk and co-morbidities (chronic disease you are already diagnosed with). Preventive services for you include: - Medicare offers their members a free annual wellness visit, which is time for you and your primary care provider to discuss and plan for your preventive service needs. Take advantage of this benefit every year! 
-All adults over age 72 should receive the recommended pneumonia vaccines. Current USPSTF guidelines recommend a series of two vaccines for the best pneumonia protection.  
-All adults should have a flu vaccine yearly and tetanus vaccine every 10 years. 
-All adults age 48 and older should receive the shingles vaccines (series of two vaccines).       
-All adults age 38-68 who are overweight should have a diabetes screening test once every three years.  
-Other screening tests & preventive services for persons with diabetes include: an eye exam to screen for diabetic retinopathy, a kidney function test, a foot exam, and stricter control over your cholesterol.  
-Cardiovascular screening for adults with routine risk involves an electrocardiogram (ECG) at intervals determined by the provider.  
-Colorectal cancer screening should be done for adults age 54-65 with no increased risk factors for colorectal cancer. There are a number of acceptable methods of screening for this type of cancer. Each test has its own benefits and drawbacks. Discuss with your provider what is most appropriate for you during your annual wellness visit. The different tests include: colonoscopy (considered the best screening method), a fecal occult blood test, a fecal DNA test, and sigmoidoscopy. 
-All adults born between [facility] should be screened once for Hepatitis C. 
-An Abdominal Aortic Aneurysm (AAA) Screening is recommended for men age 73-68 who has ever smoked in their lifetime. Here is a list of your current Health Maintenance items (your personalized list of preventive services) with a due date: 
Health Maintenance Due Topic Date Due  Shingles Vaccine (1 of 2) Never done  Cholesterol Test   06/19/2021

## 2021-06-09 NOTE — PROGRESS NOTES
1. Have you been to the ER, urgent care clinic since your last visit? Hospitalized since your last visit? No    2. Have you seen or consulted any other health care providers outside of the [address] [place] [address] since your last visit? Include any pap smears or colon screening. Yes Where: Dermatologist     This is the Subsequent Medicare Annual Wellness Exam, performed 12 months or more after the Initial AWV or the last Subsequent AWV    I have reviewed the patient's medical history in detail and updated the computerized patient record. Assessment/Plan   Education and counseling provided:  Are appropriate based on today's review and evaluation    1. Medicare annual wellness visit, subsequent       Depression Risk Factor Screening     3 most recent PHQ Screens 6/9/2021   PHQ Not Done -   Little interest or pleasure in doing things Not at all   Feeling down, depressed, irritable, or hopeless Not at all   Total Score PHQ 2 0   Feeling bad about yourself - or that you are a failure or have let yourself or your family down -       Alcohol Risk Screen    Do you average more than 1 drink per night or more than 7 drinks a week: Yes    In the past three months have you have had more than 4 drinks containing alcohol on one occasion: No        Functional Ability and Level of Safety    Hearing: Hearing is good. Activities of Daily Living: The home contains: handrails  Patient does total self care      Ambulation: with no difficulty     Fall Risk:  Fall Risk Assessment, last 12 mths 6/9/2021   Able to walk? Yes   Fall in past 12 months? 0   Do you feel unsteady?  0   Are you worried about falling 0      Abuse Screen:  Patient is not abused       Cognitive Screening    Has your family/caregiver stated any concerns about your memory: yes - Patient says he has noticed a change in his short term memory     Cognitive Screening: Not done in office today    Health Maintenance Due     Health Maintenance Due   Topic Date Due    Shingrix Vaccine Age 49> (1 of 2) Never done    Lipid Screen  06/19/2021       Patient Care Team   Patient Care Team:  Robb Cunningham MD as PCP - General (Family Medicine)  Robb Cunningham MD [facility] PCP [facility]    History     Patient Active Problem List   Diagnosis Code    Essential hypertension I10    Hep C w/o coma, chronic ([facility] B18.2    Murmur, heart R01.1    Insomnia G47.00    Benign non-nodular prostatic hyperplasia with lower urinary tract symptoms N40.1    Multiple renal cysts Q61.02    TAMERA (left atrial hypertrophy) I51.7    Calculus of gallbladder without cholecystitis without obstruction K80.20    Renal cyst N28.1    Basal cell carcinoma C44.91    PAF (paroxysmal atrial fibrillation) (HCC) I48.0    BMI 27.0-27.9,adult Z68.27    Gastroesophageal reflux disease with esophagitis K21.00    Dyslipidemia E78.5     Past Medical History:   Diagnosis Date    Asymptomatic gallstones 6/8/2017    GERD (gastroesophageal reflux disease)     Heart murmur     Hepatitis     type C    Hypertension     Mitral regurgitation     Multiple renal cysts 6/8/2017    Multiple right renal cysts Largest 8.3 cm on ultrasound June 2017      Past Surgical History:   Procedure Laterality Date    HX COLONOSCOPY  2016    HX MALIGNANT SKIN LESION EXCISION  2020    keratoacanthoma R neck     Current Outpatient Medications   Medication Sig Dispense Refill    terazosin (HYTRIN) 10 mg capsule Take 1 Cap by mouth nightly. 90 Cap 1    triamterene-hydroCHLOROthiazide (MAXZIDE) 37.5-25 mg per tablet Take 1 Tab by mouth daily. 90 Tab 1    amLODIPine-benazepril (LOTREL) 10-40 mg per capsule Take 1 Cap by mouth daily. 90 Cap 1    atorvastatin (LIPITOR) 10 mg tablet Take 1 Tab by mouth daily. 90 Tab 1    carvediloL (COREG) 25 mg tablet Take 1 Tab by mouth two (2) times a day. 180 Tab 1    omeprazole (PRILOSEC OTC) 20 mg tablet Take 20 mg by mouth daily.       aspirin delayed-release 81 mg tablet Take 1 Tab by mouth daily.  fluorouraciL (EFUDEX) 5 % chemo cream  (Patient not taking: Reported on 2021)       Allergies   Allergen Reactions    Pcn [Penicillins] Rash       Family History   Problem Relation Age of Onset    Hypertension Mother     Cancer Father         lung    Hypertension Father      Social History     Tobacco Use    Smoking status: Former Smoker     Types: Cigarettes     Quit date: 1986     Years since quittin.4    Smokeless tobacco: Never Used   Substance Use Topics    Alcohol use: Yes     Alcohol/week: 15.0 standard drinks     Types: 15 Cans of beer per week     Comment: weekly       Functional Ability:   Does the patient exhibit a steady gait? yes   How long did it take the patient to get up and walk from a sitting position? 2 seconds   Is the patient self reliant?  (ie can do own laundry, meals, household chores)  yes     Does the patient handle his/her own medications? yes     Does the patient handle his/her own money? yes     Is the patients home safe (ie good lighting, handrails on stairs and bath, etc.)? yes     Did you notice or did patient express any hearing difficulties? no     Did you notice or did patient express any vision difficulties? Yes, feels like it may be getting weaker     Were distance and reading eye charts used? no       Advance Care Planning:   Patient was offered the opportunity to discuss advance care planning:  yes     Does patient have an Advance Directive:  yes   If no, did you provide information on Caring Connections?   no     ADL Assessment 2021   Feeding yourself No Help Needed   Getting from bed to chair No Help Needed   Getting dressed No Help Needed   Bathing or showering No Help Needed   Walk across the room (includes cane/walker) No Help Needed   Using the telphone No Help Needed   Taking your medications No Help Needed   Preparing meals No Help Needed   Managing money (expenses/bills) No Help Needed Moderately strenuous housework (laundry) No Help Needed   Shopping for personal items (toiletries/medicines) No Help Needed   Shopping for groceries No Help Needed   Driving No Help Needed   Climbing a flight of stairs No Help Needed   Getting to places beyond walking distances No Help Needed       Abuse Screening Questionnaire 6/9/2021   Do you ever feel afraid of your partner? N   Are you in a relationship with someone who physically or mentally threatens you? N   Is it safe for you to go home?  Sebastien Duff, RN

## 2021-06-12 LAB — RENIN PLAS-CCNC: 0.28 NG/ML/HR (ref 0.17–5.38)

## 2021-06-14 LAB — ALDOST SERPL-MCNC: 12.2 NG/DL (ref 0–30)

## 2021-06-25 ENCOUNTER — OFFICE VISIT (OUTPATIENT)
Dept: HEMATOLOGY | Age: 71
End: 2021-06-25
Payer: MEDICARE

## 2021-06-25 VITALS
HEART RATE: 68 BPM | DIASTOLIC BLOOD PRESSURE: 75 MMHG | WEIGHT: 187 LBS | BODY MASS INDEX: 27.7 KG/M2 | HEIGHT: 69 IN | SYSTOLIC BLOOD PRESSURE: 125 MMHG | TEMPERATURE: 96.9 F | RESPIRATION RATE: 17 BRPM | OXYGEN SATURATION: 100 %

## 2021-06-25 DIAGNOSIS — E78.5 DYSLIPIDEMIA: ICD-10-CM

## 2021-06-25 DIAGNOSIS — R35.1 BENIGN PROSTATIC HYPERPLASIA WITH NOCTURIA: ICD-10-CM

## 2021-06-25 DIAGNOSIS — B18.2 HEP C W/O COMA, CHRONIC (HCC): Primary | ICD-10-CM

## 2021-06-25 DIAGNOSIS — R73.09 ABNORMAL GLUCOSE: ICD-10-CM

## 2021-06-25 DIAGNOSIS — N40.1 BENIGN PROSTATIC HYPERPLASIA WITH NOCTURIA: ICD-10-CM

## 2021-06-25 PROCEDURE — 3017F COLORECTAL CA SCREEN DOC REV: CPT | Performed by: NURSE PRACTITIONER

## 2021-06-25 PROCEDURE — 1101F PT FALLS ASSESS-DOCD LE1/YR: CPT | Performed by: NURSE PRACTITIONER

## 2021-06-25 PROCEDURE — G8752 SYS BP LESS 140: HCPCS | Performed by: NURSE PRACTITIONER

## 2021-06-25 PROCEDURE — G8432 DEP SCR NOT DOC, RNG: HCPCS | Performed by: NURSE PRACTITIONER

## 2021-06-25 PROCEDURE — G8754 DIAS BP LESS 90: HCPCS | Performed by: NURSE PRACTITIONER

## 2021-06-25 PROCEDURE — G8427 DOCREV CUR MEDS BY ELIG CLIN: HCPCS | Performed by: NURSE PRACTITIONER

## 2021-06-25 PROCEDURE — G8536 NO DOC ELDER MAL SCRN: HCPCS | Performed by: NURSE PRACTITIONER

## 2021-06-25 PROCEDURE — G8419 CALC BMI OUT NRM PARAM NOF/U: HCPCS | Performed by: NURSE PRACTITIONER

## 2021-06-25 PROCEDURE — 99213 OFFICE O/P EST LOW 20 MIN: CPT | Performed by: NURSE PRACTITIONER

## 2021-06-25 NOTE — PROGRESS NOTES
[address] \[facility]\"", MD, MD Zaira Prabhakar PA-C Erskine Dare, ACN-BC     April S Sandra, [facility] HospitalNP-BC   [facility] [place]       Adele [address]    at [address], [address] [place] [address], [place] [address].    [phone]    FAX: [address] [place] [address]    at [address], [address], [address]    [phone]    FAX: [phone]         Patient Care Team:  Stefan Church MD as PCP - General (Family Medicine)  Stefan Church MD as PCP - [facility]      Problem List  Date Reviewed: 3/3/2021        Codes Class Noted    Dyslipidemia ICD-10-CM: E78.5  ICD-9-CM: 272.4  7/24/2020        Gastroesophageal reflux disease with esophagitis ICD-10-CM: K21.00  ICD-9-CM: 530.11  6/3/2019        BMI 27.0-27.9,adult ICD-10-CM: I07.42  ICD-9-CM: V85.23  7/2/2018        PAF ([facility]oxysmal atrial fibrillation) ([facility] ICD-10-CM: I48.0  ICD-9-CM: 427.31  2/12/2018        Basal cell carcinoma ICD-10-CM: C44.91  ICD-9-CM: 173.91  7/5/2017        TAMERA (left atrial hypertrophy) ICD-10-CM: I51.7  ICD-9-CM: 429.3  6/28/2017        Calculus of gallbladder without cholecystitis without obstruction ICD-10-CM: K80.20  ICD-9-CM: 574.20  6/28/2017        Renal cyst ICD-10-CM: N28.1  ICD-9-CM: 753.10  6/28/2017        Multiple renal cysts ICD-10-CM: Q61.02  ICD-9-CM: 753.19  6/8/2017    Overview Signed 6/8/2017  6:56 PM by Lashaun Campos     Multiple right renal cysts  Largest 8.3 cm on ultrasound June 2017             Essential hypertension ICD-10-CM: I10  ICD-9-CM: 401.9  5/31/2017        Hep C w/o coma, chronic ([facility] ICD-10-CM: B18.2  ICD-9-CM: 070.54  5/31/2017        Murmur, heart ICD-10-CM: R01.1  ICD-9-CM: 785.2  5/31/2017        Insomnia ICD-10-CM: G47.00  ICD-9-CM: 780.52  5/31/2017        Benign non-nodular prostatic hyperplasia with lower urinary tract symptoms ICD-10-CM: N40.1  ICD-9-CM: 600.91  5/31/2017              Alta Galvan is being seen at [address] for management of chronic HCV. The active problem list, all pertinent past medical history, medications, radiologic findings and laboratory findings related to the liver disorder were reviewed with the patient. The patient is a 78 y.o.  male who was screened for anti-HCV and tested positive after wife was diagnosed in 2000. Risk factors for acquiring HCV are not apparent     There was no history of acute icteric hepatitis at the time of these risk factors. Ultrasound of the liver was performed in 7/2020. The results of the imaging demonstrated a normal appearing liver. Assessment of liver fibrosis was performed with Fibroscan 8/2020. The result was 7.0 kPa which correlates with stage 1 portal fibrosis to stage 2 septal fibrosis. The CAP score of 340 suggests fatty liver. The patient completed an intended 12 weeks of hepatitis C treatment with Epclusa 9/04/2020-12/2020. The HCV RNA has remained undetectable since week 4 of treatment. Testing will be ordered today to determine if he is a sustained viral responder and cured of hepatitis C. The patient has been fully vaccinated against Covid. The patient has no symptoms which can be attributed to the liver disorder. The patient is not currently experiencing the following symptoms of liver disease:  fatigue, pain in the right side over the liver, yellowing of the eyes or skin, or swelling of the abdomen,     The patient completes all daily activities without any functional limitations. ASSESSMENT AND PLAN:  Chronic HCV   Chronic HCV with stage 1 portal fibrosis to stage 2 septal fibrosis.      Severity of the liver disease was assessed by laboratory studies, Imaging, and elastography in 8/2020. Shreyasozzy Ambrose Have performed laboratory testing to monitor liver function and degree of liver injury. This included BMP, hepatic panel, CBC with platelet count and INR. Laboratory testing from 6/09/2020 reviewed in detail. Follow-up testing ordered today. The liver transaminases, ALP, liver function are normal. The platelet count has been depressed to normal.     Chronic HCV Treatment  The patient has HCV genotype 1A. The patient completed an intended 12 weeks of hepatitis C treatment with Epclusa (sofosbuvir and valpitasvir) 9/04/2020-12/2020. The HCV RNA has remained undetectable since week 4 of treatment. An HCV RNA will be ordered today. If this is negative, he will be considerede a sustained viral responder and cured of hepatitis C. No further follow-up or testing will be required. If any further testing would be ordered in the future it must be an HCV RNA. The HCV antibody will always remain positive due to exposure. Screening for Hepatocellular Carcinoma  HCC screening is not necessary if the patient has no evidence of cirrhosis. Treatment of other medical problems in patients with chronic liver disease  There are no contraindications for the patient to take most medications that are necessary for treatment of other medical issues. The patient can take: Any medications utilized for treatment of DM. Statins to treat hypercholesterolemia    The patient does not comsume alcohol on a daily basis. Normal doses of acetaminophen, as recommended on the label of the bottle, are not hepatotoxic except in the setting of daily alcohol use, even in patients with cirrhosis and can be utilized for pain. Counseling for alcohol in patients with chronic liver disease  The patient was counseled regarding alcohol consumption and the effect of alcohol on chronic liver disease. The patient does not consume any significant amount of alcohol.     Vaccinations Vaccination for viral hepatitis B is recommended since the patient has no serologic evidence of previous exposure or vaccination with immunity. Vaccination for viral hepatitis A is not needed. The patient has serologic evidence of prior exposure or vaccination with immunity. Routine vaccinations against other bacterial and viral agents can be performed as indicated. Annual flu vaccination should be administered if indicated. ALLERGIES  Allergies   Allergen Reactions    Pcn [Penicillins] Rash       MEDICATIONS  Current Outpatient Medications   Medication Sig    fluorouraciL (EFUDEX) 5 % chemo cream  (Patient not taking: Reported on 6/9/2021)    zolpidem (AMBIEN) 5 mg tablet Take 1 Tablet by mouth nightly as needed for Sleep. Max Daily Amount: 5 mg.  terazosin (HYTRIN) 10 mg capsule Take 1 Cap by mouth nightly.  triamterene-hydroCHLOROthiazide (MAXZIDE) 37.5-25 mg per tablet Take 1 Tab by mouth daily.  amLODIPine-benazepril (LOTREL) 10-40 mg per capsule Take 1 Cap by mouth daily.  atorvastatin (LIPITOR) 10 mg tablet Take 1 Tab by mouth daily.  carvediloL (COREG) 25 mg tablet Take 1 Tab by mouth two (2) times a day.  omeprazole (PRILOSEC OTC) 20 mg tablet Take 20 mg by mouth daily.  aspirin delayed-release 81 mg tablet Take 1 Tab by mouth daily. No current facility-administered medications for this visit. SYSTEM REVIEW NOT RELATED TO LIVER DISEASE OR REVIEWED ABOVE:  Constitution systems: Negative for fever, chills, weight gain, weight loss. Eyes: Negative for visual changes. ENT: Negative for sore throat, painful swallowing. Respiratory: Negative for cough, hemoptysis, SOB. Cardiology: Negative for chest pain, palpitations. GI:  Negative for constipation or diarrhea. : Negative for urinary frequency, dysuria, hematuria, nocturia. Skin: Negative for rash. Hematology: Negative for easy bruising, blood clots.     Musculo-skelatal: Negative for back pain, muscle pain, weakness. Neurologic: Negative for headaches, dizziness, vertigo, memory problems not related to HE. Psychology: Negative for anxiety, depression. FAMILY HISTORY:  Reviewed and unchanged. The father  of lung cancer. The mother  of natural causes. There is no family history of liver disease. There is no family history of immune disorders. SOCIAL HISTORY:  Reviewed and unchanged  The patient is . The spouse was treated and cured of hepatitis C in . The patient has 3 children. The patient stopped using tobacco products in . The patient consumes 2-3 alcoholic beverages per day. The patient retired in 2017. Previous employment [employer]. PHYSICAL EXAMINATION:  Visit Vitals  /75 (BP 1 Location: Right upper arm, BP Patient Position: Sitting, BP Cuff Size: Adult)   Pulse 68   Temp 96.9 °F (36.1 °C) (Temporal)   Resp 17   Ht 5' 9\" (1.753 m)   Wt 187 lb (84.8 kg)   SpO2 100%   BMI 27.62 kg/m²       General: No acute distress. Eyes: Sclera anicteric. ENT: No oral lesions. Thyroid normal.  Nodes: No adenopathy. Skin: No spider angiomata. No jaundice. No palmar erythema. Respiratory: Lungs clear to auscultation. Cardiovascular: Regular heart rate. No murmurs. No JVD. Abdomen: Soft non-tender, liver size normal to percussion/palpation. Spleen not palpable. No obvious ascites. Extremities: No edema. No muscle wasting. No gross arthritic changes. Neurologic: Alert and oriented. Cranial nerves grossly intact. No asterixis.     LABORATORY STUDIES:  Liver [place] of [address] & Units 2021 3/2/2021 10/9/2020   WBC 4.1 - 11.1 K/uL  4.8 4.4   ANC 1.8 - 8.0 K/UL  2.5 2.3   HGB 12.1 - 17.0 g/dL  14.9 14.9    - 400 K/uL  145 (L) 179   AST 15 - 37 U/L 15 18 15   ALT 12 - 78 U/L 22 31 19   Alk Phos 45 - 117 U/L 60 63 65   Bili, Total 0.2 - 1.0 MG/DL 1.2 (H) 1.0 0.7   Bili, Direct 0.0 - 0.2 MG/DL  0.3 (H) 0.20   Albumin 3.5 - 5.0 g/dL 4.5 4.4 4.6   BUN 6 - 20 MG/DL 18 13 12   Creat 0.70 - 1.30 MG/DL 1.10 1.03 1.09   Na 136 - 145 mmol/L 140 141 142   K 3.5 - 5.1 mmol/L 4.0 4.2 4.0   Cl 97 - 108 mmol/L 108 109 (H) 108 (H)   CO2 21 - 32 mmol/L 24 24 20   Glucose 65 - 100 mg/dL 101 (H) 106 (H) 123 (H)     Laboratory testing from 6/09/2021 reviewed in detail. Additional testing included to evaluate progression or regression of disease. Laboratory testing results from today will be communicated by My Chart. SEROLOGIES:  Serologies Latest Ref Rng & Units 7/24/2020 6/19/2020   Hep A Ab, Total Negative Positive (A)    Hep B Surface Ag Negative Negative    Hep B Core Ab, Total Negative Negative    Hep B Surface AB QL  Non Reactive    Hep C Genotype   1a   HCV RT-PCR, Quant IU/mL  3,090,000   HCV Log10 log10 IU/mL  6.490   Ferritin 30 - 400 ng/mL 66    Iron % Saturation 15 - 55 % 15      Virology Latest Ref Rng & Units 3/2/2021 10/9/2020   HCV RNA, JESSICA, QL Negative   Negative Negative     LIVER HISTOLOGY:  8/2020. [facility]. EkPa was 7.0. IQR/med 7%. . The CAP score suggests fatty liver. ENDOSCOPIC PROCEDURES:  Not available or performed    RADIOLOGY:  7/2020. Ultrasound of liver. Increased echogenicity consistent with steatosis. No ductal dilatation. Splenomegaly. Right renal cyst 7.3 cm x 7.2 cm x 7.6 cm in size in the upper pole. The left renal cyst measures 4.5 cm x 4.2 cm x 3.2 cm in size in the upper pole. OTHER TESTING:  Not available or performed    FOLLOW-UP:  All of the issues listed above in the Assessment and Plan were discussed with the patient. All questions were answered. The patient expressed a clear understanding of the above. Follow-up [address] as needed only if the HCV RNA remains undetectable. Recommend he have liver enzymes every 6 months due to fatty liver on Fibroscan. Advised he avoid heavy alcohol use.      April S. Sandra, [facility] of [address] [address] [address], [address] [facility], [facility] [address].  [address] [facility]

## 2021-06-25 NOTE — PROGRESS NOTES
Identified pt with two pt identifiers(name and ). Reviewed record in preparation for visit and have obtained necessary documentation. Chief Complaint   Patient presents with    Hepatitis C     f/u      Vitals:    21 0813   BP: 125/75   Pulse: 68   Resp: 17   Temp: 96.9 °F (36.1 °C)   TempSrc: Temporal   SpO2: 100%   Weight: 187 lb (84.8 kg)   Height: 5' 9\" (1.753 m)   PainSc:   0 - No pain       Health Maintenance Review: Patient reminded of \"due or due soon\" health maintenance. I have asked the patient to contact his/her primary care provider (PCP) for follow-up on his/her health maintenance. Coordination of Care Questionnaire:  :   1) Have you been to an emergency room, urgent care, or hospitalized since your last visit? If yes, where when, and reason for visit? no       2. Have seen or consulted any other health care provider since your last visit? If yes, where when, and reason for visit? YES, f/u PCP and Dermatologist      Patient is accompanied by wife I have received verbal consent from Ludmila Campbell to discuss any/all medical information while they are present in the room.

## 2021-06-26 LAB
ALBUMIN SERPL-MCNC: 4.7 G/DL (ref 3.8–4.8)
ALP SERPL-CCNC: 63 IU/L (ref 48–121)
ALT SERPL-CCNC: 21 IU/L (ref 0–44)
AST SERPL-CCNC: 20 IU/L (ref 0–40)
BASOPHILS # BLD AUTO: 0.1 X10E3/UL (ref 0–0.2)
BASOPHILS NFR BLD AUTO: 1 %
BILIRUB DIRECT SERPL-MCNC: 0.26 MG/DL (ref 0–0.4)
BILIRUB SERPL-MCNC: 0.9 MG/DL (ref 0–1.2)
BUN SERPL-MCNC: 16 MG/DL (ref 8–27)
BUN/CREAT SERPL: 14 (ref 10–24)
CALCIUM SERPL-MCNC: 9.7 MG/DL (ref 8.6–10.2)
CHLORIDE SERPL-SCNC: 103 MMOL/L (ref 96–106)
CHOLEST SERPL-MCNC: 131 MG/DL (ref 100–199)
CO2 SERPL-SCNC: 21 MMOL/L (ref 20–29)
CREAT SERPL-MCNC: 1.15 MG/DL (ref 0.76–1.27)
EOSINOPHIL # BLD AUTO: 0.2 X10E3/UL (ref 0–0.4)
EOSINOPHIL NFR BLD AUTO: 5 %
ERYTHROCYTE [DISTWIDTH] IN BLOOD BY AUTOMATED COUNT: 13 % (ref 11.6–15.4)
EST. AVERAGE GLUCOSE BLD GHB EST-MCNC: 111 MG/DL
GLUCOSE SERPL-MCNC: 112 MG/DL (ref 65–99)
HBA1C MFR BLD: 5.5 % (ref 4.8–5.6)
HCT VFR BLD AUTO: 45.3 % (ref 37.5–51)
HDLC SERPL-MCNC: 45 MG/DL
HGB BLD-MCNC: 15 G/DL (ref 13–17.7)
IMM GRANULOCYTES # BLD AUTO: 0 X10E3/UL (ref 0–0.1)
IMM GRANULOCYTES NFR BLD AUTO: 0 %
LDLC SERPL CALC-MCNC: 57 MG/DL (ref 0–99)
LYMPHOCYTES # BLD AUTO: 1.2 X10E3/UL (ref 0.7–3.1)
LYMPHOCYTES NFR BLD AUTO: 29 %
MCH RBC QN AUTO: 29.4 PG (ref 26.6–33)
MCHC RBC AUTO-ENTMCNC: 33.1 G/DL (ref 31.5–35.7)
MCV RBC AUTO: 89 FL (ref 79–97)
MONOCYTES # BLD AUTO: 0.5 X10E3/UL (ref 0.1–0.9)
MONOCYTES NFR BLD AUTO: 13 %
NEUTROPHILS # BLD AUTO: 2.2 X10E3/UL (ref 1.4–7)
NEUTROPHILS NFR BLD AUTO: 52 %
PLATELET # BLD AUTO: 157 X10E3/UL (ref 150–450)
POTASSIUM SERPL-SCNC: 4 MMOL/L (ref 3.5–5.2)
PROT SERPL-MCNC: 7.6 G/DL (ref 6–8.5)
PSA SERPL-MCNC: 1.6 NG/ML (ref 0–4)
RBC # BLD AUTO: 5.1 X10E6/UL (ref 4.14–5.8)
SODIUM SERPL-SCNC: 139 MMOL/L (ref 134–144)
TRIGL SERPL-MCNC: 174 MG/DL (ref 0–149)
VLDLC SERPL CALC-MCNC: 29 MG/DL (ref 5–40)
WBC # BLD AUTO: 4.2 X10E3/UL (ref 3.4–10.8)

## 2021-06-27 LAB — HCV RNA SERPL QL NAA+PROBE: NEGATIVE

## 2021-06-29 PROBLEM — Z86.19 HEPATITIS C VIRUS INFECTION CURED AFTER ANTIVIRAL DRUG THERAPY: Status: ACTIVE | Noted: 2017-05-31

## 2021-06-29 NOTE — PROGRESS NOTES
My chart message sent to the patient regarding the blood work results. The hepatitis C is undetectable. He is cured and does not need further follow-up.

## 2021-09-08 ENCOUNTER — OFFICE VISIT (OUTPATIENT)
Dept: FAMILY MEDICINE CLINIC | Age: 71
End: 2021-09-08
Payer: MEDICARE

## 2021-09-08 VITALS
SYSTOLIC BLOOD PRESSURE: 139 MMHG | RESPIRATION RATE: 16 BRPM | TEMPERATURE: 97.3 F | HEART RATE: 58 BPM | OXYGEN SATURATION: 97 % | HEIGHT: 69 IN | BODY MASS INDEX: 27.75 KG/M2 | WEIGHT: 187.38 LBS | DIASTOLIC BLOOD PRESSURE: 89 MMHG

## 2021-09-08 DIAGNOSIS — I10 ESSENTIAL HYPERTENSION: Primary | ICD-10-CM

## 2021-09-08 PROCEDURE — G8427 DOCREV CUR MEDS BY ELIG CLIN: HCPCS | Performed by: FAMILY MEDICINE

## 2021-09-08 PROCEDURE — G8752 SYS BP LESS 140: HCPCS | Performed by: FAMILY MEDICINE

## 2021-09-08 PROCEDURE — 99213 OFFICE O/P EST LOW 20 MIN: CPT | Performed by: FAMILY MEDICINE

## 2021-09-08 PROCEDURE — G8754 DIAS BP LESS 90: HCPCS | Performed by: FAMILY MEDICINE

## 2021-09-08 PROCEDURE — 3017F COLORECTAL CA SCREEN DOC REV: CPT | Performed by: FAMILY MEDICINE

## 2021-09-08 PROCEDURE — G8419 CALC BMI OUT NRM PARAM NOF/U: HCPCS | Performed by: FAMILY MEDICINE

## 2021-09-08 PROCEDURE — G8536 NO DOC ELDER MAL SCRN: HCPCS | Performed by: FAMILY MEDICINE

## 2021-09-08 PROCEDURE — G8432 DEP SCR NOT DOC, RNG: HCPCS | Performed by: FAMILY MEDICINE

## 2021-09-08 PROCEDURE — 1101F PT FALLS ASSESS-DOCD LE1/YR: CPT | Performed by: FAMILY MEDICINE

## 2021-09-08 NOTE — PATIENT INSTRUCTIONS
Begin sodium chloride (salt tab) 1 gm three times daily for three days on 9/11. On third day come in to have blood drawn and  24 hr urine collection jug. Follow instructions closely. Bring back jug in 24 hrs.

## 2021-09-08 NOTE — PROGRESS NOTES
1. Have you been to the ER, urgent care clinic since your last visit? Hospitalized since your last visit? No    2. Have you seen or consulted any other health care providers outside of the [address] [place] [address] since your last visit? Include any pap smears or colon screening. No     9/8/2021      Chief Complaint   Patient presents with    Hypertension    Cholesterol Problem    Hoarse         History of Present Illness:         is a 78 y.o. male returns feeling well. Screening suggested reasonable probability of hyperaldosteronism. Needs urinary test to confirm. BP at home running in the 130s and 80s. Allergies   Allergen Reactions    Pcn [Penicillins] Rash       Current Outpatient Medications   Medication Sig    terazosin (HYTRIN) 10 mg capsule Take 1 Cap by mouth nightly.  triamterene-hydroCHLOROthiazide (MAXZIDE) 37.5-25 mg per tablet Take 1 Tab by mouth daily.  amLODIPine-benazepril (LOTREL) 10-40 mg per capsule Take 1 Cap by mouth daily.  atorvastatin (LIPITOR) 10 mg tablet Take 1 Tab by mouth daily.  carvediloL (COREG) 25 mg tablet Take 1 Tab by mouth two (2) times a day.  omeprazole (PRILOSEC OTC) 20 mg tablet Take 20 mg by mouth daily.  aspirin delayed-release 81 mg tablet Take 1 Tab by mouth daily.  fluorouraciL (EFUDEX) 5 % chemo cream  (Patient not taking: Reported on 6/9/2021)    zolpidem (AMBIEN) 5 mg tablet Take 1 Tablet by mouth nightly as needed for Sleep. Max Daily Amount: 5 mg. (Patient not taking: Reported on 9/8/2021)     No current facility-administered medications for this visit. Physical Examination:    Visit Vitals  /89   Pulse (!) 58   Temp 97.3 °F (36.3 °C) (Oral)   Resp 16   Ht 5' 9\" (1.753 m)   Wt 187 lb 6 oz (85 kg)   SpO2 97%   BMI 27.67 kg/m²      General:  Alert, cooperative, no distress. HEENT:  Normocephalic, without obvious abnormality, atraumatic. Conjunctivae/corneas clear. Pupils equal, round, reactive to light. Extraocular movements intact. TMs and external canals normal bilaterally. Nasal mucosa and oropharynx clear. Lungs: Clear to auscultation bilaterally. Chest wall:  No tenderness or deformity. Heart:  Regular rate and rhythm, S1, S2 normal, no murmur, click, rub, or gallop. Abdomen:   Soft, non-tender. Bowel sounds normal. No masses. No organomegaly. Extremities: Extremities normal, atraumatic, no cyanosis or edema. Pulses: 2+ and symmetric all extremities. Skin: Skin color, texture, turgor normal. No rashes or lesions. Lymph nodes: Cervical, supraclavicular, and axillary nodes normal.   Neurologic: CNII-XII intact. Normal strength, sensation, and reflexes throughout. ASSESSMENT AND PLAN    1. Essential hypertension  Refractory. Sodium load x 72 hours then check urinary aldosterone and Na.  - SODIUM, UR, 24 HR;  Future  - ALDOSTERONE, UR; Future  - METABOLIC PANEL, BASIC; Future          Orders Placed This Encounter    SODIUM, UR, 24 HR     Standing Status:   Future     Standing Expiration Date:   9/8/2022    ALDOSTERONE, UR     24 hr     Standing Status:   Future     Standing Expiration Date:   7/5/9416    METABOLIC PANEL, BASIC     Standing Status:   Future     Standing Expiration Date:   9/8/2022           Isa Stuart MD

## 2021-09-09 ENCOUNTER — PATIENT MESSAGE (OUTPATIENT)
Dept: FAMILY MEDICINE CLINIC | Age: 71
End: 2021-09-09

## 2021-09-09 RX ORDER — SODIUM CHLORIDE TAB 1 GM 1 G
1 TAB MISCELLANEOUS 3 TIMES DAILY
Qty: 12 TABLET | Refills: 0 | Status: SHIPPED | OUTPATIENT
Start: 2021-09-09

## 2021-09-09 NOTE — TELEPHONE ENCOUNTER
From: Elnita Closs  To: Lazaro Starks MD  Sent: 9/9/2021 10:36 AM EDT  Subject: Prescription Question    Have not been able to find salt pills. May have to order them. May have to cancel lab if this takes awhile. Any ideas?     Victoriano Gatica

## 2021-09-09 NOTE — TELEPHONE ENCOUNTER
I sent it as an order to [facility] though it is OTC.  See if they will order them.  smg

## 2021-09-20 ENCOUNTER — LAB ONLY (OUTPATIENT)
Dept: FAMILY MEDICINE CLINIC | Age: 71
End: 2021-09-20

## 2021-09-20 DIAGNOSIS — B18.2 HEP C W/O COMA, CHRONIC (HCC): ICD-10-CM

## 2021-09-20 DIAGNOSIS — I10 ESSENTIAL HYPERTENSION: ICD-10-CM

## 2021-09-20 NOTE — PROGRESS NOTES
Patient presents for lab draw ordered by:    Ordering Provider:  Dr Flaco Lozada Department/Practice:  [facility]  Phone:  [phone]  Date Ordered:  09-    The following labs were drawn and sent to [facility] [place] by Kelly Goodman RN:    CBC, BMP and Hepatic Function Panel and HCV RNA by JESSICA SEAY, RFLX to QT    The following tubes were sent:    Gold  ( 2) and Lavender  ( 1)

## 2021-09-21 LAB
ALBUMIN SERPL-MCNC: 4.2 G/DL (ref 3.5–5)
ALBUMIN/GLOB SERPL: 1.1 {RATIO} (ref 1.1–2.2)
ALP SERPL-CCNC: 62 U/L (ref 45–117)
ALT SERPL-CCNC: 23 U/L (ref 12–78)
ANION GAP SERPL CALC-SCNC: 6 MMOL/L (ref 5–15)
AST SERPL-CCNC: 18 U/L (ref 15–37)
BASOPHILS # BLD: 0 K/UL (ref 0–0.1)
BASOPHILS NFR BLD: 1 % (ref 0–1)
BILIRUB DIRECT SERPL-MCNC: 0.4 MG/DL (ref 0–0.2)
BILIRUB SERPL-MCNC: 1.6 MG/DL (ref 0.2–1)
BUN SERPL-MCNC: 21 MG/DL (ref 6–20)
BUN/CREAT SERPL: 14 (ref 12–20)
CALCIUM SERPL-MCNC: 9.9 MG/DL (ref 8.5–10.1)
CHLORIDE SERPL-SCNC: 109 MMOL/L (ref 97–108)
CO2 SERPL-SCNC: 25 MMOL/L (ref 21–32)
CREAT SERPL-MCNC: 1.54 MG/DL (ref 0.7–1.3)
DIFFERENTIAL METHOD BLD: NORMAL
EOSINOPHIL # BLD: 0.3 K/UL (ref 0–0.4)
EOSINOPHIL NFR BLD: 6 % (ref 0–7)
ERYTHROCYTE [DISTWIDTH] IN BLOOD BY AUTOMATED COUNT: 13.2 % (ref 11.5–14.5)
GLOBULIN SER CALC-MCNC: 3.7 G/DL (ref 2–4)
GLUCOSE SERPL-MCNC: 99 MG/DL (ref 65–100)
HCT VFR BLD AUTO: 42 % (ref 36.6–50.3)
HGB BLD-MCNC: 13.8 G/DL (ref 12.1–17)
IMM GRANULOCYTES # BLD AUTO: 0 K/UL (ref 0–0.04)
IMM GRANULOCYTES NFR BLD AUTO: 0 % (ref 0–0.5)
LYMPHOCYTES # BLD: 1.3 K/UL (ref 0.8–3.5)
LYMPHOCYTES NFR BLD: 25 % (ref 12–49)
MCH RBC QN AUTO: 29.5 PG (ref 26–34)
MCHC RBC AUTO-ENTMCNC: 32.9 G/DL (ref 30–36.5)
MCV RBC AUTO: 89.7 FL (ref 80–99)
MONOCYTES # BLD: 0.6 K/UL (ref 0–1)
MONOCYTES NFR BLD: 12 % (ref 5–13)
NEUTS SEG # BLD: 2.8 K/UL (ref 1.8–8)
NEUTS SEG NFR BLD: 56 % (ref 32–75)
NRBC # BLD: 0 K/UL (ref 0–0.01)
NRBC BLD-RTO: 0 PER 100 WBC
PLATELET # BLD AUTO: 156 K/UL (ref 150–400)
PMV BLD AUTO: 11.3 FL (ref 8.9–12.9)
POTASSIUM SERPL-SCNC: 4 MMOL/L (ref 3.5–5.1)
PROT SERPL-MCNC: 7.9 G/DL (ref 6.4–8.2)
RBC # BLD AUTO: 4.68 M/UL (ref 4.1–5.7)
SODIUM SERPL-SCNC: 140 MMOL/L (ref 136–145)
WBC # BLD AUTO: 5 K/UL (ref 4.1–11.1)

## 2021-09-23 LAB
COLLECT DURATION TIME UR: 24 HR
HCV RNA SERPL QL NAA+PROBE: NEGATIVE
SODIUM 24H UR-SRATE: 58 MMOL/24HR (ref 40–220)
SPECIMEN VOL 24H UR: 825 ML

## 2021-09-28 NOTE — PROGRESS NOTES
My chart message sent to the patient regarding the blood work results. The hepatitis C is cured. The liver numbers and function are normal. The creatinine was elevated. He will need to see the PCP for follow up on this.

## 2021-09-29 ENCOUNTER — TELEPHONE (OUTPATIENT)
Dept: FAMILY MEDICINE CLINIC | Age: 71
End: 2021-09-29

## 2021-09-29 LAB
ALDOST 24H UR-MRATE: 10.16 UG/24 HR (ref 0–19)
ALDOST UR-MCNC: 12.32 UG/L
COLLECT DURATION TIME UR: 24 HR
SPECIMEN VOL ?TM UR: 825 ML

## 2021-09-29 NOTE — TELEPHONE ENCOUNTER
I called and talked to this patient. He says that he got a message from Jackie Flores NP yesterday. In the message she mentioned his kidney test results and follow up with his PCP. Mr. Palomo Nicholas wants to know if you can explain to him what she means about his kidney test results.   I tried to make him an apt but he wants to discuss this first.

## 2021-09-29 NOTE — TELEPHONE ENCOUNTER
----- Message from NITHIN Rodriguez sent at 9/29/2021  8:29 AM EDT -----  Regarding: Test Results Question  Contact: [phone]  Curious about test result from last visit.  Elevated kidney numbers noted by Jackie Flores

## 2021-10-04 NOTE — PROGRESS NOTES
Urine lab test shows normal amount of aldosterone. If this were high it could be contributing to your high blood pressure. This test rules that out as a factor.

## 2021-10-04 NOTE — TELEPHONE ENCOUNTER
Per Dr Kacey Martinez, he has gotten the completed results and has sent a My Chart Message to the patient. No further action is required.

## 2021-11-22 DIAGNOSIS — F51.09 SITUATIONAL INSOMNIA: Primary | ICD-10-CM

## 2021-11-22 RX ORDER — MIRTAZAPINE 7.5 MG/1
TABLET, FILM COATED ORAL
Qty: 30 TABLET | Refills: 2 | Status: SHIPPED | OUTPATIENT
Start: 2021-11-22